# Patient Record
Sex: MALE | Employment: UNEMPLOYED | URBAN - METROPOLITAN AREA
[De-identification: names, ages, dates, MRNs, and addresses within clinical notes are randomized per-mention and may not be internally consistent; named-entity substitution may affect disease eponyms.]

---

## 2022-01-04 ENCOUNTER — HOSPITAL ENCOUNTER (INPATIENT)
Age: 62
LOS: 19 days | Discharge: HOME | DRG: 885 | End: 2022-01-24
Attending: STUDENT IN AN ORGANIZED HEALTH CARE EDUCATION/TRAINING PROGRAM | Admitting: PSYCHIATRY & NEUROLOGY
Payer: MEDICARE

## 2022-01-04 DIAGNOSIS — F23 ACUTE PSYCHOSIS (HCC): Primary | ICD-10-CM

## 2022-01-04 PROCEDURE — 96372 THER/PROPH/DIAG INJ SC/IM: CPT

## 2022-01-04 PROCEDURE — 99285 EMERGENCY DEPT VISIT HI MDM: CPT

## 2022-01-05 PROBLEM — F29 PSYCHOSIS (HCC): Status: ACTIVE | Noted: 2022-01-05

## 2022-01-05 PROBLEM — F30.9 MANIA (HCC): Status: ACTIVE | Noted: 2022-01-05

## 2022-01-05 LAB
AMPHET UR QL SCN: NEGATIVE
ANION GAP SERPL CALC-SCNC: 3 MMOL/L (ref 5–15)
APAP SERPL-MCNC: <10 UG/ML (ref 10–30)
APPEARANCE UR: ABNORMAL
BACTERIA URNS QL MICRO: NEGATIVE /HPF
BARBITURATES UR QL SCN: NEGATIVE
BASOPHILS # BLD: 0 K/UL (ref 0–0.1)
BASOPHILS NFR BLD: 1 % (ref 0–1)
BENZODIAZ UR QL: NEGATIVE
BILIRUB UR QL: NEGATIVE
BUN SERPL-MCNC: 17 MG/DL (ref 6–20)
BUN/CREAT SERPL: 17 (ref 12–20)
CA-I BLD-MCNC: 9 MG/DL (ref 8.5–10.1)
CANNABINOIDS UR QL SCN: NEGATIVE
CHLORIDE SERPL-SCNC: 105 MMOL/L (ref 97–108)
CO2 SERPL-SCNC: 27 MMOL/L (ref 21–32)
COCAINE UR QL SCN: NEGATIVE
COLOR UR: ABNORMAL
CREAT SERPL-MCNC: 0.98 MG/DL (ref 0.7–1.3)
DIFFERENTIAL METHOD BLD: NORMAL
DRUG SCRN COMMENT,DRGCM: NORMAL
EOSINOPHIL # BLD: 0.2 K/UL (ref 0–0.4)
EOSINOPHIL NFR BLD: 3 % (ref 0–7)
ERYTHROCYTE [DISTWIDTH] IN BLOOD BY AUTOMATED COUNT: 11.5 % (ref 11.5–14.5)
ETHANOL SERPL-MCNC: <4 MG/DL
FLUAV RNA SPEC QL NAA+PROBE: NOT DETECTED
FLUBV RNA SPEC QL NAA+PROBE: NOT DETECTED
GLUCOSE SERPL-MCNC: 99 MG/DL (ref 65–100)
GLUCOSE UR STRIP.AUTO-MCNC: NEGATIVE MG/DL
HCT VFR BLD AUTO: 41.1 % (ref 36.6–50.3)
HGB BLD-MCNC: 14 G/DL (ref 12.1–17)
HGB UR QL STRIP: NEGATIVE
IMM GRANULOCYTES # BLD AUTO: 0 K/UL (ref 0–0.04)
IMM GRANULOCYTES NFR BLD AUTO: 0 % (ref 0–0.5)
KETONES UR QL STRIP.AUTO: 20 MG/DL
LEUKOCYTE ESTERASE UR QL STRIP.AUTO: NEGATIVE
LITHIUM SERPL-SCNC: 1.1 MMOL/L (ref 0.6–1.2)
LYMPHOCYTES # BLD: 1.9 K/UL (ref 0.8–3.5)
LYMPHOCYTES NFR BLD: 30 % (ref 12–49)
MCH RBC QN AUTO: 31.7 PG (ref 26–34)
MCHC RBC AUTO-ENTMCNC: 34.1 G/DL (ref 30–36.5)
MCV RBC AUTO: 93.2 FL (ref 80–99)
METHADONE UR QL: NEGATIVE
MONOCYTES # BLD: 0.7 K/UL (ref 0–1)
MONOCYTES NFR BLD: 12 % (ref 5–13)
MUCOUS THREADS URNS QL MICRO: ABNORMAL /LPF
NEUTS SEG # BLD: 3.5 K/UL (ref 1.8–8)
NEUTS SEG NFR BLD: 54 % (ref 32–75)
NITRITE UR QL STRIP.AUTO: NEGATIVE
NRBC # BLD: 0 K/UL (ref 0–0.01)
NRBC BLD-RTO: 0 PER 100 WBC
OPIATES UR QL: NEGATIVE
PCP UR QL: NEGATIVE
PH UR STRIP: 7 [PH] (ref 5–8)
PLATELET # BLD AUTO: 211 K/UL (ref 150–400)
PMV BLD AUTO: 9.9 FL (ref 8.9–12.9)
POTASSIUM SERPL-SCNC: 3.7 MMOL/L (ref 3.5–5.1)
PROT UR STRIP-MCNC: NEGATIVE MG/DL
RBC # BLD AUTO: 4.41 M/UL (ref 4.1–5.7)
RBC #/AREA URNS HPF: ABNORMAL /HPF (ref 0–5)
SALICYLATES SERPL-MCNC: <1.7 MG/DL (ref 2.8–20)
SARS-COV-2, COV2: NOT DETECTED
SODIUM SERPL-SCNC: 135 MMOL/L (ref 136–145)
SP GR UR REFRACTOMETRY: 1.02 (ref 1–1.03)
UA: UC IF INDICATED,UAUC: ABNORMAL
UROBILINOGEN UR QL STRIP.AUTO: 0.1 EU/DL (ref 0.1–1)
WBC # BLD AUTO: 6.3 K/UL (ref 4.1–11.1)
WBC URNS QL MICRO: ABNORMAL /HPF (ref 0–4)
YEAST URNS QL MICRO: ABNORMAL

## 2022-01-05 PROCEDURE — 80178 ASSAY OF LITHIUM: CPT

## 2022-01-05 PROCEDURE — 96372 THER/PROPH/DIAG INJ SC/IM: CPT

## 2022-01-05 PROCEDURE — 74011250637 HC RX REV CODE- 250/637: Performed by: PSYCHIATRY & NEUROLOGY

## 2022-01-05 PROCEDURE — 87636 SARSCOV2 & INF A&B AMP PRB: CPT

## 2022-01-05 PROCEDURE — 74011250637 HC RX REV CODE- 250/637: Performed by: STUDENT IN AN ORGANIZED HEALTH CARE EDUCATION/TRAINING PROGRAM

## 2022-01-05 PROCEDURE — 80179 DRUG ASSAY SALICYLATE: CPT

## 2022-01-05 PROCEDURE — 65220000003 HC RM SEMIPRIVATE PSYCH

## 2022-01-05 PROCEDURE — 82077 ASSAY SPEC XCP UR&BREATH IA: CPT

## 2022-01-05 PROCEDURE — 81001 URINALYSIS AUTO W/SCOPE: CPT

## 2022-01-05 PROCEDURE — 80307 DRUG TEST PRSMV CHEM ANLYZR: CPT

## 2022-01-05 PROCEDURE — 85025 COMPLETE CBC W/AUTO DIFF WBC: CPT

## 2022-01-05 PROCEDURE — 36415 COLL VENOUS BLD VENIPUNCTURE: CPT

## 2022-01-05 PROCEDURE — 80048 BASIC METABOLIC PNL TOTAL CA: CPT

## 2022-01-05 PROCEDURE — 74011250636 HC RX REV CODE- 250/636

## 2022-01-05 PROCEDURE — 80143 DRUG ASSAY ACETAMINOPHEN: CPT

## 2022-01-05 RX ORDER — HYDROXYZINE 50 MG/1
50 TABLET, FILM COATED ORAL
Status: DISCONTINUED | OUTPATIENT
Start: 2022-01-05 | End: 2022-01-12

## 2022-01-05 RX ORDER — HALOPERIDOL 5 MG/ML
5 INJECTION INTRAMUSCULAR
Status: DISCONTINUED | OUTPATIENT
Start: 2022-01-05 | End: 2022-01-12

## 2022-01-05 RX ORDER — LORAZEPAM 2 MG/ML
INJECTION INTRAMUSCULAR
Status: COMPLETED
Start: 2022-01-05 | End: 2022-01-05

## 2022-01-05 RX ORDER — LORAZEPAM 1 MG/1
2 TABLET ORAL
Status: COMPLETED | OUTPATIENT
Start: 2022-01-05 | End: 2022-01-05

## 2022-01-05 RX ORDER — HALOPERIDOL 5 MG/ML
5 INJECTION INTRAMUSCULAR ONCE
Status: COMPLETED | OUTPATIENT
Start: 2022-01-05 | End: 2022-01-05

## 2022-01-05 RX ORDER — ACETAMINOPHEN 325 MG/1
650 TABLET ORAL
Status: DISCONTINUED | OUTPATIENT
Start: 2022-01-05 | End: 2022-01-24 | Stop reason: HOSPADM

## 2022-01-05 RX ORDER — LORAZEPAM 2 MG/ML
2 INJECTION INTRAMUSCULAR
Status: COMPLETED | OUTPATIENT
Start: 2022-01-05 | End: 2022-01-05

## 2022-01-05 RX ORDER — LORAZEPAM 1 MG/1
1 TABLET ORAL
Status: DISCONTINUED | OUTPATIENT
Start: 2022-01-05 | End: 2022-01-13

## 2022-01-05 RX ORDER — TRAZODONE HYDROCHLORIDE 50 MG/1
50 TABLET ORAL
Status: DISCONTINUED | OUTPATIENT
Start: 2022-01-05 | End: 2022-01-09

## 2022-01-05 RX ORDER — LITHIUM CARBONATE 300 MG/1
300 TABLET, FILM COATED, EXTENDED RELEASE ORAL 2 TIMES DAILY
COMMUNITY
End: 2022-01-24

## 2022-01-05 RX ORDER — ADHESIVE BANDAGE
30 BANDAGE TOPICAL DAILY PRN
Status: DISCONTINUED | OUTPATIENT
Start: 2022-01-05 | End: 2022-01-24 | Stop reason: HOSPADM

## 2022-01-05 RX ORDER — HALOPERIDOL 5 MG/ML
INJECTION INTRAMUSCULAR
Status: COMPLETED
Start: 2022-01-05 | End: 2022-01-05

## 2022-01-05 RX ORDER — HALOPERIDOL 5 MG/1
5 TABLET ORAL
Status: DISCONTINUED | OUTPATIENT
Start: 2022-01-05 | End: 2022-01-12

## 2022-01-05 RX ORDER — LORAZEPAM 2 MG/ML
1 INJECTION INTRAMUSCULAR
Status: DISCONTINUED | OUTPATIENT
Start: 2022-01-05 | End: 2022-01-17

## 2022-01-05 RX ORDER — LITHIUM CARBONATE 300 MG
300 TABLET ORAL 2 TIMES DAILY
Status: DISCONTINUED | OUTPATIENT
Start: 2022-01-05 | End: 2022-01-06

## 2022-01-05 RX ADMIN — LITHIUM CARBONATE 300 MG: 300 TABLET ORAL at 21:16

## 2022-01-05 RX ADMIN — TRAZODONE HYDROCHLORIDE 50 MG: 50 TABLET ORAL at 21:16

## 2022-01-05 RX ADMIN — HALOPERIDOL LACTATE 5 MG: 5 INJECTION, SOLUTION INTRAMUSCULAR at 00:41

## 2022-01-05 RX ADMIN — LORAZEPAM 2 MG: 2 INJECTION INTRAMUSCULAR at 00:41

## 2022-01-05 RX ADMIN — LORAZEPAM 2 MG: 2 INJECTION INTRAMUSCULAR; INTRAVENOUS at 00:41

## 2022-01-05 RX ADMIN — LORAZEPAM 1 MG: 1 TABLET ORAL at 23:55

## 2022-01-05 RX ADMIN — LORAZEPAM 2 MG: 1 TABLET ORAL at 00:23

## 2022-01-05 RX ADMIN — HALOPERIDOL 5 MG: 5 INJECTION INTRAMUSCULAR at 00:41

## 2022-01-05 NOTE — BSMART NOTE
Patient accepted by Dr. Melo Members to [address] [place] [address]. Spoke with Dr. Mitch Chavez on the phone with patient- she informed patient of plan after his for seen discharge. Patient calm and cooperative during call- expressed gratefulness to have a plan to go home to [facility] after discharge. Patient also gave verbal consent to Dr. Mitch Chavez to share information with his wife and Dr. Xu Cox ([facility]).

## 2022-01-05 NOTE — ED TRIAGE NOTES
Pt to [facility] via EMS, coming from [facility] for \"running around naked threatening to hurt people\", pt deneis any Si/Hi at this time, pt alert but unable to answer any valuable questions about why he was brought here, flight of ideas noted, supposedly works with [employer], pt unable to confirm this, Vss

## 2022-01-05 NOTE — ED NOTES
[facility]19 states we need to take out the ECO that the staff here needs to do it before she can pre screen him

## 2022-01-05 NOTE — ED NOTES
TRANSFER - OUT REPORT:    Verbal report given to Ashanti ORELLANA on Lubna Muñoz  being transferred to [facility] for routine progression of care       Report consisted of patients Situation, Background, Assessment and   Recommendations(SBAR). Information from the following report(s) SBAR and Recent Results was reviewed with the receiving nurse. Lines:       Opportunity for questions and clarification was provided.       Patient transported with:   Mover

## 2022-01-05 NOTE — BSMART NOTE
Dr[facility] Carm[facility] Carp[facility] and would like to be contacted in regards to patient placement once bed is found and any other significant changes. States she is leading patient's case on [employer]'s end. Can be contacted at [phone] or [phone].

## 2022-01-05 NOTE — ED NOTES
Pt placed hands on sitter and attempted to leave the ER, CODE JIMMIE called, VO for 2mg ativan IM and 5mg Haldol IM given by ER MD

## 2022-01-05 NOTE — PROGRESS NOTES
Spiritual Care Assessment/Progress Note  [facility]      NAME: Bernabe Gunderson      MRN: [id number]  AGE: 64 y.o.  SEX: male  Lutheran Affiliation:    Language:      1/5/2022     Total Time (in minutes): 10     Spiritual Assessment begun in [facility] EMERGENCY DEPT through conversation with:         [x]Patient        [] Family    [] Friend(s)        Reason for Consult: Initial/Spiritual assessment, patient floor     Spiritual beliefs: (Please include comment if needed)     [x] Identifies with a marcelina tradition:  Jainism       [] Supported by a marcelina community:            [] Claims no spiritual orientation:           [] Seeking spiritual identity:                [] Adheres to an individual form of spirituality:           [] Not able to assess:                           Identified resources for coping:      [] Prayer                               [] Music                  [] Guided Imagery     [x] Family/friends                 [] Pet visits     [] Devotional reading                         [] Unknown     [] Other:                                              Interventions offered during this visit: (See comments for more details)    Patient Interventions: Affirmation of marcelina,Catharsis/review of pertinent events in supportive environment,Coping skills reviewed/reinforced,Iconic (affirming the presence of God/Higher Power)           Plan of Care:     [] Support spiritual and/or cultural needs    [] Support AMD and/or advance care planning process      [] Support grieving process   [] Coordinate Rites and/or Rituals    [] Coordination with community clergy   [] No spiritual needs identified at this time   [] Detailed Plan of Care below (See Comments)  [] Make referral to Music Therapy  [] Make referral to Pet Therapy     [] Make referral to Addiction services  [] Make referral to [facility]  [] Make referral to Spiritual Care Partner  [] No future visits requested        [x] Contact Spiritual Care for further referrals     Comments:  visit for initial spiritual assessment. Spoke to patient's nurse prior to visit, patient awaiting [facility] [place] for further treatment and care. Patient sitting up in gurney eating his lunch, good eye contact, friendly. Provided spiritual presence and listening as he spoke of his present thoughts, feelings, and concerns. Upon greeting and introduction, patient spoke of his marcelina speaking about his relationship with God and his ability to see God in others, which he holds dear, and not seeing God in others, whom he places in a trap since they tend to place him in a trap due to not being able to see as he sees. Says he is looking forward to going home and hopes to return to his family. Says he has seven (7) children and twenty (20) grandchildren. 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 He stated he was comfortable and not in need of anything at this time. He appeared comforted and encouraged as a result of this visit and expressed gratitude for this visit. Informed him of availability of  and pastoral care services asking him to have his nurse contact the  should he desire a  visit and he agreed giving a thumbs up and fist bump. Rev.  Adriana Vila MDiv, [facility], [facility]   paging service: [phone])

## 2022-01-05 NOTE — ED NOTES
[employer] called for update and state if needed to call them at the following numbers     [employer] [phone]    [facility] [employer] [phone]
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
## 2022-01-05 NOTE — ED NOTES
[facility] confirmed this is the patients name and .      ECO paperwork faxed to  at this time

## 2022-01-05 NOTE — ED NOTES
Bud SAMUEL called on patient @ 0030 Patient in the hallway acting bizarre patient encouraged back into the room by security.  Bea LOWE called at this time and [facility]

## 2022-01-05 NOTE — ED NOTES
Returned MD Maria Antonia Mckeon phone call regarding the patient. MD stated that he does not have any family here and his wife is in [address] [place]. MD wants to be notified if the patient decides to leave AMA so that she can try and talk to him regarding it.
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
## 2022-01-05 NOTE — ED PROVIDER NOTES
EMERGENCY DEPARTMENT HISTORY AND PHYSICAL EXAM      Date: 1/4/2022  Patient Name: Mira Burks      History of Presenting Illness     Chief Complaint   Patient presents with   [address] Problem       History Provided By: Patient    HPI: Mira Burks, 64 y.o. male with no known past medical history of note presents to the ED by EMS. EMS reported the patient is running around naked threatening to hurt people. I did evaluate the patient he appears psychotic and manic. I was unable to obtain accurate history, nor contact information for this patient. There are no other complaints, changes, or physical findings at this time. PCP: Unknown, Provider, DPM        Past History     Past Medical History:  History reviewed. No pertinent past medical history. Past Surgical History:  History reviewed. No pertinent surgical history. Family History:  History reviewed. No pertinent family history. Social History:  Social History     Tobacco Use    Smoking status: Not on file    Smokeless tobacco: Not on file   Substance Use Topics    Alcohol use: Yes     Alcohol/week: 2.0 standard drinks     Types: 2 Shots of liquor per week     Comment: 2 shots every two weeks    Drug use: Not on file       Allergies:  No Known Allergies      Review of Systems     Review of Systems   Unable to perform ROS: Psychiatric disorder       Physical Exam     Physical Exam  Vitals and nursing note reviewed. Constitutional:       Appearance: He is obese. He is not ill-appearing or diaphoretic. HENT:      Head: Normocephalic and atraumatic. Eyes:      Extraocular Movements: Extraocular movements intact. Conjunctiva/sclera: Conjunctivae normal.   Cardiovascular:      Rate and Rhythm: Normal rate and regular rhythm. Pulmonary:      Effort: Pulmonary effort is normal.      Breath sounds: Normal breath sounds. Abdominal:      Palpations: Abdomen is soft. Tenderness: There is no abdominal tenderness. Neurological:      General: No focal deficit present. Mental Status: He is alert. Psychiatric:         Attention and Perception: He is inattentive. Mood and Affect: Mood is elated. Speech: Speech is rapid and pressured. Lab and Diagnostic Study Results     Labs -     No results found for this or any previous visit (from the past 12 hour(s)). Radiologic Studies -   [unfilled]  CT Results  (Last 48 hours)    None        CXR Results  (Last 48 hours)    None          Medical Decision Making and ED Course   - I am the first and primary provider for this patient AND AM THE PRIMARY PROVIDER OF RECORD. - I reviewed the vital signs, available nursing notes, past medical history, past surgical history, family history and social history. - Initial assessment performed. The patients presenting problems have been discussed, and the staff are in agreement with the care plan formulated and outlined with them. I have encouraged them to ask questions as they arise throughout their visit. Vital Signs-Reviewed the patient's vital signs. Patient Vitals for the past 24 hrs:   Temp Pulse Resp BP SpO2   01/06/22 0938 98.6 °F (37 °C) 98 18 (!) 148/94    01/06/22 0820 98.6 °F (37 °C) 98 18 (!) 148/94    01/05/22 1922 99.3 °F (37.4 °C) (!) 105 18 131/76 100 %   01/05/22 1820 98.2 °F (36.8 °C) (!) 101  (!) 168/88        Records Reviewed: Nursing Notes    Provider Notes (Medical Decision Making):     Patient is presenting to the ED by EMS with bizarre behavior. Patient appeared manic and probably of some component of psychosis. His examination did not reveal any focal medical issue. Screening labs were obtained which did not show any acute abnormality. Patient had an episode of aggressive behavior in the ED requiring administration of Haldol and Ativan. This helped calm down the patient.   Pending evaluation by behavioral health for final disposition which is anticipated to be hospitalization. Disposition     Disposition: Condition stable  Admitted to [facility] the case was discussed with the admitting physician     Behavioral Health Hold    Diagnosis     Clinical Impression:   Acute psychosis  Aggressive behavior  Attestations: Roslyn Molina MD    Please note that this dictation was completed with [employer], the computer voice recognition software. Quite often unanticipated grammatical, syntax, homophones, and other interpretive errors are inadvertently transcribed by the computer software. Please disregard these errors. Please excuse any errors that have escaped final proofreading. Thank you.
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
## 2022-01-06 PROCEDURE — 74011250637 HC RX REV CODE- 250/637: Performed by: PSYCHIATRY & NEUROLOGY

## 2022-01-06 PROCEDURE — 65220000003 HC RM SEMIPRIVATE PSYCH

## 2022-01-06 RX ORDER — LITHIUM CARBONATE 300 MG
300 TABLET ORAL 3 TIMES DAILY
Status: DISCONTINUED | OUTPATIENT
Start: 2022-01-06 | End: 2022-01-24 | Stop reason: HOSPADM

## 2022-01-06 RX ADMIN — HYDROXYZINE HYDROCHLORIDE 50 MG: 50 TABLET, FILM COATED ORAL at 21:09

## 2022-01-06 RX ADMIN — LITHIUM CARBONATE 300 MG: 300 TABLET ORAL at 08:18

## 2022-01-06 RX ADMIN — LITHIUM CARBONATE 300 MG: 300 TABLET ORAL at 21:09

## 2022-01-06 RX ADMIN — LITHIUM CARBONATE 300 MG: 300 TABLET ORAL at 15:22

## 2022-01-06 RX ADMIN — TRAZODONE HYDROCHLORIDE 50 MG: 50 TABLET ORAL at 23:33

## 2022-01-06 RX ADMIN — LORAZEPAM 1 MG: 1 TABLET ORAL at 08:18

## 2022-01-06 RX ADMIN — ACETAMINOPHEN 650 MG: 325 TABLET ORAL at 23:33

## 2022-01-06 RX ADMIN — HALOPERIDOL 5 MG: 5 TABLET ORAL at 04:48

## 2022-01-06 RX ADMIN — LORAZEPAM 1 MG: 1 TABLET ORAL at 21:09

## 2022-01-06 NOTE — PROGRESS NOTES
Problem: Falls - Risk of  Goal: *Absence of Falls  Description: Document Raymundo Boles Fall Risk and appropriate interventions in the flowsheet. Outcome: Progressing Towards Goal  Note: Fall Risk Interventions:            Problem: Depressed Mood (Adult/Pediatric)  Goal: *STG: Remains safe in hospital  Outcome: Progressing Towards Goal     Problem: Depressed Mood (Adult/Pediatric)  Goal: *STG: Complies with medication therapy  Outcome: Progressing Towards Goal     Problem: Anxiety  Goal: *Alleviation of anxiety  Outcome: Not Progressing Towards Goal    Patient remains safe in the hospital; no falls reported or observed. Patient denies SI, HI, A/V hallucinations. Patient requested anxiety medication, but did not rate anxiety or depression. Patient received Atarax and the medication was effective; no report of anxiety.

## 2022-01-06 NOTE — PROGRESS NOTES
Patient alert and oriented to person and place. Patient denies anxiety and depression. He denies si/hi/avh. Patient in milieu interacting and attending groups. Patient is bizarre and occasionally confused. He is approaching and pushing on back doors and office entrance door. Patient has pressured speech and is anxious. He has a flat affect and anxious mood. Patient making statements about working for the [employer] and curing polio. Patient is medication compliant, calm and cooperative at this time. Will continue to monitor.
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
## 2022-01-06 NOTE — GROUP NOTE
IP  GROUP DOCUMENTATION INDIVIDUAL                                                                          Group Therapy Note    Date: 1/6/2022    Group Start Time: 0930  Group End Time: 5844  Group Topic: Education Group - Inpatient    SRM 2  NON ACUTE    Jasbir June    IP [address] GROUP DOCUMENTATION GROUP    Group Therapy Note    Facilitated group to introduce the definition and benefits of diaphragmatic breathing and demonstration of relaxation technique    Attendees: 7/13         Attendance: Attended    Patient's Goal:  Attend group daily     Interventions/techniques: Informed and Supported    Follows Directions: Followed directions    Interactions: Interacted appropriately    Mental Status: Calm    Behavior/appearance: Cooperative    Goals Achieved: Able to engage in interactions and Able to listen to others       Additional Notes:  Receptive to information discussed and was able to demonstrate ability to practice diaphragmatic breathing technique. Was able to sit quietly and focus on guided imagery.  Verbalized feeling relaxed    Jermaine Kole, CTRS

## 2022-01-06 NOTE — PROGRESS NOTES
Problem: Falls - Risk of  Goal: *Absence of Falls  Description: Document [place] Fall Risk and appropriate interventions in the flowsheet.   Outcome: Progressing Towards Goal  Note: Fall Risk Interventions:                                Problem: Patient Education: Go to Patient Education Activity  Goal: Patient/Family Education  Outcome: Progressing Towards Goal

## 2022-01-06 NOTE — BH NOTES
Patient woke up and started to push on the back doors until they opened and was on the back unit briefly. Patient continues to be confused with delusions that he is base; patient is not violent and is redirectable. Patient stated that he can feel the gravity under his feet and the morning on the floor. Patient given PRN Haldol 5 mg PO; compliant and currently resting.

## 2022-01-06 NOTE — BH NOTES
Nurse Note:    Patient observed in the dayroom tearful at the beginning of the shift. During the assessment patient appeared flat and confused; patient stated, \"Can I stay here until my family comes\". Patient needed to be reoriented. Patient denies SI, HI, A/V hallucinations. No report of anxiety or depression. No report of pain or discomfort. Patient was medication compliant, requested medication for sleep and tolerated the medications. Patient is resting quietly; will continue to monitor for safety.
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
## 2022-01-06 NOTE — BH NOTES
Patient slept for a couple of hours before pushing on the back door and entering the unit. Patient was easily redirected by the behavioral tech back to the front. While on the hallway patient continued to push on the back and side door; staff explained that he should not push on the doors causing the alarms to come on. Patient appeared confused and postured when asked to return to the room; the posture was not threatening. Patient redirected to his room and observed again on the hallway. Patient redirected back to the room again; patient put his hands behind his back as if in formation in the [facility] and thought \"we\" as in the staff and himself were to cure Polio. Patient reported anxiety, but did not rate it and received Ativan 1 mg PO for anxiety and is currently resting quietly. Will continue to monitor for safety.

## 2022-01-06 NOTE — GROUP NOTE
[facility] GROUP DOCUMENTATION INDIVIDUAL                                                                          Group Therapy Note    Date: 1/6/2022    Group Start Time: 1115  Group End Time: 1150  Group Topic: Process Group - Inpatient    SRM CARE MANAGEMENT    Elke Torres BSW    [facility] GROUP DOCUMENTATION GROUP    Group Therapy Note    The writer encouraged the patient's to process through open conversation     Attendees: 8/12         Attendance: Attended    Patient's Goal:  Attend Group     Interventions/techniques: Promoted peer support and Provide feedback    Follows Directions: Followed directions    Interactions: Interacted appropriately    Mental Status: Calm, Delusions and Flat    Behavior/appearance: Attentive, Cooperative and Neatly groomed    Goals Achieved: Able to engage in interactions, Able to listen to others, Able to give feedback to another, Able to reflect/comment on own behavior, Able to receive feedback and Able to self-disclose      Additional Notes: The patient encouraged peers to speak and was attentive in group. He was open and talked about living in [place] with the group and was appropriate in group.      BROOKE Segura

## 2022-01-06 NOTE — BH NOTES
PSA PART II ADDITIONAL INFORMATION        Access To Fire Arms: No    Substance Use: NO    Last Use: N/A    Type of Substance: no substance use    Frequency of Use: N/A    Request to See : YES    If yes, notified : NO    Release of Information Signed: NO    Release of Information Signed For: The patient is unable to remember numbers

## 2022-01-06 NOTE — GROUP NOTE
IP  GROUP DOCUMENTATION INDIVIDUAL                                                                          Group Therapy Note    Date: 1/6/2022    Group Start Time: 1320  Group End Time: 2999  Group Topic: Recreational/Music Therapy    SRM 2  NON ACUTE    Mary Lou Lee    IP [facility] GROUP DOCUMENTATION GROUP    Group Therapy Note    Facilitated leisure skills group to reinforce positive coping through music, social interaction, group activities and arts/crafts    Attendees: 8/12         Attendance: Attended    Patient's Goal:  Attend group daily    Interventions/techniques: Art integration and Supported    Follows Directions: Followed directions    Interactions: Interacted appropriately    Mental Status: Calm    Behavior/appearance: Cooperative    Goals Achieved: Able to engage in interactions and Able to listen to others      Additional Notes:  Receptive to listening to music and a song he selected. Pt was observed doing different hand movements. Decline to work on leisure task.  Interacted with peers and staff     Manju Gordillo

## 2022-01-07 PROCEDURE — 65220000003 HC RM SEMIPRIVATE PSYCH

## 2022-01-07 PROCEDURE — 74011250637 HC RX REV CODE- 250/637: Performed by: PSYCHIATRY & NEUROLOGY

## 2022-01-07 RX ADMIN — LORAZEPAM 1 MG: 1 TABLET ORAL at 08:39

## 2022-01-07 RX ADMIN — HYDROXYZINE HYDROCHLORIDE 50 MG: 50 TABLET, FILM COATED ORAL at 22:03

## 2022-01-07 RX ADMIN — LITHIUM CARBONATE 300 MG: 300 TABLET ORAL at 08:39

## 2022-01-07 RX ADMIN — LITHIUM CARBONATE 300 MG: 300 TABLET ORAL at 22:03

## 2022-01-07 RX ADMIN — TRAZODONE HYDROCHLORIDE 50 MG: 50 TABLET ORAL at 22:03

## 2022-01-07 RX ADMIN — LITHIUM CARBONATE 300 MG: 300 TABLET ORAL at 17:10

## 2022-01-07 NOTE — GROUP NOTE
IP  GROUP DOCUMENTATION INDIVIDUAL                                                                          Group Therapy Note    Date: 1/7/2022    Group Start Time: 2902  Group End Time: 1400  Group Topic: Recreational/Music Therapy    SRM 2  NON ACUTE    Cali Licona    IP [address] [facility] GROUP DOCUMENTATION GROUP    Group Therapy Note    Facilitated leisure skills group to reinforce positive coping through music, social interaction, group activities and arts/crafts    Attendees: 9/13         Attendance: Attended    Patient's Goal:  Attend group daily     Interventions/techniques: Art integration and Supported    Follows Directions: Followed directions    Interactions: Interacted appropriately    Mental Status: Calm    Behavior/appearance: Cooperative    Goals Achieved: Able to engage in interactions and Able to listen to others      Additional Notes:  Receptive to listening to music and a song he selected while working on leisure task.  Interacted with when prompted    GUSTABO Moore

## 2022-01-07 NOTE — BH NOTES
Behavioral Health Treatment Team Note     Patient goal(s) for today: To be discharged   Treatment team focus/goals: To continue group therapy and medication management     Progress note: The pt had eloped from the unit earlier in the morning around 0830. He got to the main entrance of the hospital and had his belongings in his hand. He was able to be redirected by staff eventually to return to the unit, after he continued to say \"I am discharging myself\". Once on the unit this writer asked if he would like to contact his psychologist Holger Villa together, which he said he would be willing to do. He had explained what had happened and that he wanted to discharge himself. She validated his frustrations about being hospitalized, although explained to him that he would have no where to go, and that it would have been dangerous if he had done that. She said that he has no way back home to [place], and said that he cannot return to [facility], as they have already cleaned out his room there. She expressed that she was worried about his judgement right now. The psychologist also spoke about wanting him to be TDO'd. The pt explained that \"this has been happening\" since December 24th, although was guarded about what he was referring to. He eventually said that he normally takes Lithium which he did not come to [place] with. He said that he can tell when he is about to have a \"high episode\", referring to a manic episode. This writer gave him positive reinforcement for his level of insight. The pt seemed paranoid towards the end of the session, stating that his psychologists voice sounded different, and didn't feel like it was actually her. He denied SI/HI and AH/VH's. An inpatient level of care is necessary in order to stabilize the pt further on medications.      LOS:  2  Expected LOS: 5-7 days     Insurance info/prescription coverage:  Self-pay  Date of last family contact:  Spoke with his psychologist today  Family requesting physician contact today:  no  Discharge plan:   To continue to stabilize the pt's symptoms and mood further   Guns in the home:  no   Outpatient provider(s):  Psychologist Mary Galvan     Participating treatment team members: Thaddeus Cortez, * (assigned SW), Matias Lama LMSW

## 2022-01-07 NOTE — PROGRESS NOTES
Problem: Falls - Risk of  Goal: *Absence of Falls  Description: Document Felice Childs Fall Risk and appropriate interventions in the flowsheet.   Outcome: Progressing Towards Goal  Note: Fall Risk Interventions:                                Problem: Patient Education: Go to Patient Education Activity  Goal: Patient/Family Education  Outcome: Progressing Towards Goal     Problem: Depressed Mood (Adult/Pediatric)  Goal: *STG: Complies with medication therapy  Outcome: Progressing Towards Goal

## 2022-01-07 NOTE — BH NOTES
Nurse Note:    Patient observed in the hallway and in the dayroom. Patient is alert with some confusion at times, doesn't know where he is and why he is here. Currently denies SI, HI, A/V hallucinations. Reports anxiety, but did not rate the depression. Denies depression and pain. Patient states that he is eating meals and sleeping. Patient is medication compliant; received Ativan for anxiety; medication is effective. Patient is currently sleeping at this time. Will continue to monitor for safety.

## 2022-01-07 NOTE — PROGRESS NOTES
Problem: Falls - Risk of  Goal: *Absence of Falls  Description: Document [place] Fail Fall Risk and appropriate interventions in the flowsheet. Outcome: Progressing Towards Goal  Note: Fall Risk Interventions:          Problem: Depressed Mood (Adult/Pediatric)  Goal: *STG: Remains safe in hospital  Outcome: Progressing Towards Goal     Problem: Depressed Mood (Adult/Pediatric)  Goal: *STG: Complies with medication therapy  Outcome: Progressing Towards Goal      No falls reported or observed this shift. Currently denies SI, HI, A/V hallucinations. Patient is medication compliant; reports anxiety, but did not rate the anxiety. Received medication for anxiety; medication is effective. Patient is calm and currently sleeping at this time.

## 2022-01-07 NOTE — PROGRESS NOTES
Patient alert and oriented to self and place. Patient eloped from unit this am. He was in lobby with staff. Patient agreed to come up after several prompts. Dr. Pedro Mckeon notified. New orders received for 1:1. Patient received prn and morning medication. He is in dayroom eating at this time. Will continue to monitor.

## 2022-01-07 NOTE — BH NOTES
Writer observed pt in [facility]. Argelia Zuniga was called and writer alerted pts  Hermelinda Yusuf. , writer and [employer] spoke with pt in [facility] who stated he is ready for discharge and wants to return to his family. Pt said he has been in his same 'loop'.  Hermelinda Yusuf was able to redirect pt and he came back onto [address] [place] [address]

## 2022-01-07 NOTE — GROUP NOTE
IP  GROUP DOCUMENTATION INDIVIDUAL                                                                          Group Therapy Note    Date: 1/7/2022    Group Start Time: 0930  Group End Time: 3447  Group Topic: Education Group - Inpatient    SRM 2  NON ACUTE    Edis Ryan    IP [address] [facility] GROUP DOCUMENTATION GROUP    Group Therapy Note    Facilitated discussion focused on learning to explore and communicate feelings    Attendees: 8/13         Attendance: Attended    Patient's Goal:  Attend group daily     Interventions/techniques: Informed and Supported    Follows Directions: Followed directions    Interactions: Interacted appropriately    Mental Status: Calm    Behavior/appearance: Cooperative    Goals Achieved: Able to engage in interactions, Able to listen to others and Able to self-disclose      Additional Notes:  Receptive to information and engaged in discussion. Pt was able to share what make him feel a certain way.  Pt shared he feel happy when \"with his family\" and feel sad when Bernabe Martinez is being stopped from going to see his family and his wife\"    Dave Valle, [address]

## 2022-01-07 NOTE — GROUP NOTE
MARNIE  GROUP DOCUMENTATION INDIVIDUAL                                                                          Group Therapy Note    Date: 1/7/2022    Group Start Time: 1115  Group End Time: 1150  Group Topic: Process Group - Inpatient    SRM CARE MANAGEMENT    Elke Torres BSW    [facility] GROUP DOCUMENTATION GROUP    Group Therapy Note    The writer encouraged the group to process their time and what they have learned at the hospital, through open discussion and support for one another. Attendees: 10/13          Attendance: Attended    Patient's Goal:  Attend Group     Interventions/techniques: Supported    Follows Directions: Followed directions    Interactions: Interacted appropriately    Mental Status: Calm, Delusions, Hallucinations and Happy    Behavior/appearance: Attentive, Cooperative, Enthusiastic, Motivated and Needed prompting    Goals Achieved: Able to engage in interactions, Able to listen to others, Able to give feedback to another, Able to receive feedback and Able to self-disclose      Additional Notes: The patient expressed delusions when he was speaking and was hard to follow, but he was redirectable and was appropriate in group.      BROOKE Montalvo

## 2022-01-08 PROCEDURE — 65220000003 HC RM SEMIPRIVATE PSYCH

## 2022-01-08 PROCEDURE — 74011250637 HC RX REV CODE- 250/637: Performed by: PSYCHIATRY & NEUROLOGY

## 2022-01-08 PROCEDURE — 36415 COLL VENOUS BLD VENIPUNCTURE: CPT

## 2022-01-08 PROCEDURE — 82607 VITAMIN B-12: CPT

## 2022-01-08 RX ORDER — MULTIVITAMIN
1 TABLET ORAL DAILY
Status: DISCONTINUED | OUTPATIENT
Start: 2022-01-09 | End: 2022-01-24 | Stop reason: HOSPADM

## 2022-01-08 RX ORDER — QUETIAPINE FUMARATE 100 MG/1
100 TABLET, FILM COATED ORAL
Status: DISCONTINUED | OUTPATIENT
Start: 2022-01-08 | End: 2022-01-09

## 2022-01-08 RX ADMIN — ACETAMINOPHEN 650 MG: 325 TABLET ORAL at 13:04

## 2022-01-08 RX ADMIN — LORAZEPAM 1 MG: 1 TABLET ORAL at 21:28

## 2022-01-08 RX ADMIN — LITHIUM CARBONATE 300 MG: 300 TABLET ORAL at 08:34

## 2022-01-08 RX ADMIN — LORAZEPAM 1 MG: 1 TABLET ORAL at 02:11

## 2022-01-08 RX ADMIN — HYDROXYZINE HYDROCHLORIDE 50 MG: 50 TABLET, FILM COATED ORAL at 21:28

## 2022-01-08 RX ADMIN — LITHIUM CARBONATE 300 MG: 300 TABLET ORAL at 16:58

## 2022-01-08 RX ADMIN — QUETIAPINE FUMARATE 100 MG: 100 TABLET ORAL at 22:22

## 2022-01-08 RX ADMIN — LITHIUM CARBONATE 300 MG: 300 TABLET ORAL at 21:28

## 2022-01-08 RX ADMIN — TRAZODONE HYDROCHLORIDE 50 MG: 50 TABLET ORAL at 21:28

## 2022-01-08 NOTE — GROUP NOTE
IP  GROUP DOCUMENTATION INDIVIDUAL                                                                          Group Therapy Note    Date: 1/8/2022    Group Start Time: 0930  Group End Time: 4514  Group Topic: Education Group - Inpatient    SRM 2 BH NON ACUTE    Haley Burk    IP [address] [facility] GROUP DOCUMENTATION GROUP    Group Therapy Note    Attendees: 5/12    Facilitated structured group discussion about Automatic Negative Thoughts. Introduced common thoughts that are problematic in coping and cause negative emotions and identified positive ways to cope with those negative thoughts. Attendance: Attended    Patient's Goal:  STG: Attends activities and groups      Interventions/techniques: Informed, Provide feedback and Supported    Follows Directions: Followed directions    Interactions: Interacted appropriately    Mental Status: Calm and Flat    Behavior/appearance: Attentive    Goals Achieved: Able to engage in interactions and Able to listen to others      Additional Notes: Attended group discussion. Received materials provided in group. Engaged in group discussion and verbalized understanding of automatic negative thoughts. Recognized how this way of thinking can cause negative emotions and increased stress. Was receptive to intervention.     Nicolas Trujillo, CTRS

## 2022-01-08 NOTE — BH NOTES
08:00  Patient continues with one to one observation for flight risk: patient focused on vitamin B, asking several times for this,  Patient encouraged to ask doctor for vitamin orders. Patient verbalized understanding, He denied SI, HI, AH, VH, depression and anxiety stating he \"slept 2 hours night to burn off anxiety. \"  Patient with loose associations. Patient remains on close observation, Q 15 minute checks. 10:00  Patient attending group this morning. 12:00  Patient eating lunch in the dining room, social with peers. 13:05  One to one discontinued, per Dr. Saba Castillo. Patient promised not to go near the doors.

## 2022-01-08 NOTE — GROUP NOTE
IP  GROUP DOCUMENTATION INDIVIDUAL                                                                          Group Therapy Note    Date: 1/8/2022    Group Start Time: 1320  Group End Time: 1410  Group Topic: Recreational/Music Therapy    SRM 2 BH NON ACUTE    Erlene Florencio    IP [address] [facility] GROUP DOCUMENTATION GROUP    Group Therapy Note    Attendees: 9/13    Facilitated structured group to introduce healthy leisure task skill as positive way to cope and manage stress. Attendance: Attended    Patient's Goal:  STG: Attends activities and groups        Interventions/techniques: Art integration and Supported    Follows Directions: Followed directions    Interactions: Interacted appropriately    Mental Status: Calm and Flat    Behavior/appearance: Attentive and Cooperative    Goals Achieved: Able to engage in interactions and Able to listen to others      Additional Notes: Attended group and actively participated. Offered leisure packet. Worked on task in group and  listened to music. Was receptive to intervention and used time constructively.     Renard Tee

## 2022-01-08 NOTE — BH NOTES
Patient spent the ozzie watching TV or playing cards with peer in the day room. Patient has 1:1 staff with him, and has made no attempt to head to the door and no comments about leaving. Brita Spatz he is doing good, denies HI/SI and denies AVH, continues with some manic behavior. Patient took PRN at bedtime, for sleep, but at 0230 was still wide awake and anxious. Patient asked for an Ambien, stated he takes same at home when he needs it. Ativan given, suggested patient ask the Dr for Ambien on next rounds. Patient restless despite meds, barely slept, quiet but still managed to keep the roommate awake. Snores loudly. Pleasant,. No behavior problems.  Appears confused at times but seems as if he's trying to cover it up

## 2022-01-08 NOTE — PROGRESS NOTES
Progress Note  Date:2022       Room:[facility]  Patient Name:Anvi Flores     YOB: 1960     Age:61 y.o. Subjective    Subjective   Patient still presents with hypomanic mood racing thoughts impaired insight. But has been continuing to improve more coherent in conversation. He states he was able to sleep but not very much. He is focused on having to get back on vitamins and vitamin B12. He does report he has been up at night on and off and slept for a few hours and that his roommate keeps him awake. His roommate reports that he is up and talks to self and distract him. Patient denies any command hallucinations and feels his thoughts is improving. Mental status examination-patient is alert oriented to name place being in the hospital.  He acknowledges that he needs to be here for a few more days. He states he was able to talk to his granddaugter. He is less manic. Still impairment and his thought processes loosening of associations and flight of ideas. Insight judgment coping remains impaired but improving. Review of Systems  Objective         Vitals Last 24 Hours:  TEMPERATURE:  Temp  Av.9 °F (37.2 °C)  Min: 98.2 °F (36.8 °C)  Max: 99.5 °F (37.5 °C)  RESPIRATIONS RANGE: Resp  Av  Min: 18  Max: 20  PULSE OXIMETRY RANGE: No data recorded  PULSE RANGE: Pulse  Av  Min: 93  Max: 95  BLOOD PRESSURE RANGE: Systolic (40KHY), MM , Min:141 , IKU:136   ; Diastolic (65DGS), UQJ:53, Min:87, Max:95    I/O (24Hr): No intake or output data in the 24 hours ending 22 1201  Objective  Labs/Imaging/Diagnostics    Labs:  CBC:No results for input(s): WBC, RBC, HGB, HCT, MCV, RDW, PLT, HGBEXT, HCTEXT, PLTEXT in the last 72 hours. CHEMISTRIES:No results for input(s): NA, K, CL, CO2, BUN, CA, PHOS, MG in the last 72 hours. No lab exists for component: CREATININE, GLUCOSEPT/INR:No results for input(s): INR, INREXT in the last 72 hours.     No lab exists for component: PROTIME  APTT:No results for input(s): APTT in the last 72 hours. LIVER PROFILE:No results for input(s): AST, ALT in the last 72 hours. No lab exists for component: BILIDIR, BILITOT, ALKPHOS  No results found for: ALT, AST, GGT, GGTP, AP, APIT, APX, CBIL, TBIL, TBILI    Imaging Last 24 Hours:  No results found. Assessment//Plan   Active Problems:    [facility] ([facility] (1/5/2022)      Vesta ([facility] (1/5/2022)      Assessment & Plan  Started on Seroquel 100 mg at bedtime as he has been continuing to be hypomanic and still not able to sleep and rest well at night. Still presents with racing thoughts and his associations. Side effects reported vitamin B12 as patient states he is on vitamin B-12 tablets on the outside.       Current Facility-Administered Medications:     QUEtiapine (SEROquel) tablet 100 mg, 100 mg, Oral, QHS, Jesenia Leal MD    [START ON 1/9/2022] multivitamin with folic acid (ONE DAILY WITH FOLIC ACID) tablet 1 Tablet, 1 Tablet, Oral, DAILY, Jesenia Leal MD    lithium carbonate tablet 300 mg, 300 mg, Oral, TID, Jesenia Leal MD, 300 mg at 01/08/22 0834    hydrOXYzine HCL (ATARAX) tablet 50 mg, 50 mg, Oral, TID PRN, Gloria BARNEY MD, 50 mg at 01/07/22 2203    traZODone (DESYREL) tablet 50 mg, 50 mg, Oral, QHS PRN, Flex Cartwright MD, 50 mg at 01/07/22 2203    acetaminophen (TYLENOL) tablet 650 mg, 650 mg, Oral, Q4H PRN, Gloria BARNEY MD, 650 mg at 01/06/22 2333    magnesium hydroxide (MILK OF MAGNESIA) 400 mg/5 mL oral suspension 30 mL, 30 mL, Oral, DAILY PRN, Romie Larios MD    haloperidoL (HALDOL) tablet 5 mg, 5 mg, Oral, Q8H PRN, Lisseth Conroy MD, 5 mg at 01/06/22 0448    haloperidol lactate (HALDOL) injection 5 mg, 5 mg, IntraMUSCular, Q8H PRN, Jesenia Leal MD    LORazepam (ATIVAN) tablet 1 mg, 1 mg, Oral, Q8H PRN, Jesenia Leal MD, 1 mg at 01/08/22 0211    LORazepam (ATIVAN) injection 1 mg, 1 mg, IntraMUSCular, Q8H PRN, Lisseth Conroy MD  Electronically signed by Lawrence King MD on 1/8/2022 at 12:01 PM

## 2022-01-09 LAB — VIT B12 SERPL-MCNC: 1515 PG/ML (ref 193–986)

## 2022-01-09 PROCEDURE — 65220000003 HC RM SEMIPRIVATE PSYCH

## 2022-01-09 PROCEDURE — 74011250637 HC RX REV CODE- 250/637: Performed by: PSYCHIATRY & NEUROLOGY

## 2022-01-09 RX ORDER — QUETIAPINE FUMARATE 100 MG/1
200 TABLET, FILM COATED ORAL
Status: DISCONTINUED | OUTPATIENT
Start: 2022-01-09 | End: 2022-01-24 | Stop reason: HOSPADM

## 2022-01-09 RX ORDER — TRAZODONE HYDROCHLORIDE 50 MG/1
100 TABLET ORAL
Status: DISCONTINUED | OUTPATIENT
Start: 2022-01-09 | End: 2022-01-24 | Stop reason: HOSPADM

## 2022-01-09 RX ADMIN — LITHIUM CARBONATE 300 MG: 300 TABLET ORAL at 16:41

## 2022-01-09 RX ADMIN — HALOPERIDOL 5 MG: 5 TABLET ORAL at 08:05

## 2022-01-09 RX ADMIN — LORAZEPAM 1 MG: 1 TABLET ORAL at 09:19

## 2022-01-09 RX ADMIN — QUETIAPINE FUMARATE 200 MG: 100 TABLET ORAL at 21:19

## 2022-01-09 RX ADMIN — MULTIVITAMIN TABLET 1 TABLET: TABLET at 08:05

## 2022-01-09 RX ADMIN — LITHIUM CARBONATE 300 MG: 300 TABLET ORAL at 21:19

## 2022-01-09 RX ADMIN — LORAZEPAM 1 MG: 1 TABLET ORAL at 21:19

## 2022-01-09 RX ADMIN — LITHIUM CARBONATE 300 MG: 300 TABLET ORAL at 08:05

## 2022-01-09 NOTE — GROUP NOTE
IP  GROUP DOCUMENTATION INDIVIDUAL                                                                          Group Therapy Note    Date: 1/9/2022    Group Start Time: 1320  Group End Time: 1410  Group Topic: Recreational/Music Therapy    SRM 2 BH NON ACUTE    Ran Comings    IP [address] [facility] GROUP DOCUMENTATION GROUP    Group Therapy Note    Attendees:10/13  Facilitated structured group to introduce healthy leisure task skill as positive way to cope and manage stress. Attendance: Attended    Patient's Goal:  STG: Attends activities and groups        Interventions/techniques: Art integration and Supported    Follows Directions: Followed directions    Interactions: Interacted appropriately    Mental Status: Flat    Behavior/appearance: Cooperative    Goals Achieved: Able to listen to others      Additional Notes: Attended group and actively participated. Offered leisure packet. Declined packet but listened to music with peers. Able to select songs in group. Was receptive to intervention and used time constructively.     ZACARIAS ZaldivarS

## 2022-01-09 NOTE — BH NOTES
At the end of lunch, pt became verbally abusive to writer when explained to him that he could not keep the tan food tray and that he could keep the styrofoam one instead. Pt  threatened to throw milk on writer and began to closely follow behind writer. While stopped at the nurses' station, pt became verbally abusive again as he retrieved some items from the cart and walked down to the dayroom. During vitals, pt was asked to clean his items from the shower so that his roommate could use it. Patient became verbally abusive to writer. Pt became irredirectable. Pt, then postured towards staff and then sat down on his bed. Writer exited the room.

## 2022-01-09 NOTE — GROUP NOTE
IP  GROUP DOCUMENTATION INDIVIDUAL                                                                          Group Therapy Note    Date: 1/9/2022    Group Start Time: 0930  Group End Time: 0311  Group Topic: Education Group - Inpatient    SRM 2 BH NON ACUTE    Romana Bombard    [facility] GROUP DOCUMENTATION GROUP    Group Therapy Note    Attendees:9/13    Facilitated structured group discussion to identify strengths, positive attributes and to identify positive affirmations used to increase self esteem and coping with stressors. Attendance: Attended but left early    Patient's Goal: STG: Attends activities and groups      Interventions/techniques: Informed, Provide feedback and Supported    Follows Directions: Other minimal engagement    Interactions: Other minimal engagement    Mental Status: Calm    Behavior/appearance: Needed prompting    Goals Achieved: Able to listen to others      Additional Notes:  Initially attended group. Left group verbalizing he needed to talk to the nurse. Stated he needed to rest. Returned later to gather his belongings and left group and did not return.        Harini Lassiter, CTRS

## 2022-01-09 NOTE — BH NOTES
MHT informed writer that patient was threatening to throw milk on her. Patient was about 4 foot behind her with his arm up holding the open milk container, as if to make good his threat. Patient was easily redirected, returning to the dayroom with part of his breakfast.  Prn ativan given p.o.

## 2022-01-09 NOTE — BH NOTES
Patient affect flat, fair eye contact, and delusional.  Patient stating \"I have a suspicion, it appears a lazor system has been set up to steal personal information, everybodies personal information. \"  He then showed me his right arm as proof, where there is a reddened area approximately 2 1/2 to 3 inches square to the anticubital area. Patient informed writer that this was from a neoprine patch, that when the laser shined on him, it created the red area, if \"there was no bandage the laser does not affect it. \" \"I've got monumental proof. \"  Patient denied SI, HI, AH, and VH. Patient given prn haldol. Patient medication compliant and attends group. Patient remains on close observation, Q 15 minute checks.

## 2022-01-09 NOTE — PROGRESS NOTES
Progress Note  Date:2022       Room:[facility]  Patient Name:Avni Flores     YOB: 1960     Age:61 y.o. Subjective    Subjective   Patient has been presenting with delusional thought process. Patient reports that there is a laser system that is been placed on him and that he is stating personal information and selling other patients information. Patient continues to show area and his right forearm but a red discoloration which he states is from the laser. He states that I should involved risk-management to round up the bad guys and address the laser placement in this floor. Patient has been having poor sleep. He is reported to have slept 2 hours. Review of Systems  Objective         Vitals Last 24 Hours:  TEMPERATURE:  Temp  Av °F (37.2 °C)  Min: 99 °F (37.2 °C)  Max: 99 °F (37.2 °C)  RESPIRATIONS RANGE: Resp  Av  Min: 18  Max: 18  PULSE OXIMETRY RANGE: No data recorded  PULSE RANGE: Pulse  Av  Min: 96  Max: 96  BLOOD PRESSURE RANGE: Systolic (99PVZ), SEGUNDO:164 , Min:149 , OX   ; Diastolic (38LWL), RMV:61, Min:84, Max:84    I/O (24Hr): No intake or output data in the 24 hours ending 22 1354  Objective  Labs/Imaging/Diagnostics    Labs:  CBC:No results for input(s): WBC, RBC, HGB, HCT, MCV, RDW, PLT, HGBEXT, HCTEXT, PLTEXT in the last 72 hours. CHEMISTRIES:No results for input(s): NA, K, CL, CO2, BUN, CA, PHOS, MG in the last 72 hours. No lab exists for component: CREATININE, GLUCOSEPT/INR:No results for input(s): INR, INREXT in the last 72 hours. No lab exists for component: PROTIME  APTT:No results for input(s): APTT in the last 72 hours. LIVER PROFILE:No results for input(s): AST, ALT in the last 72 hours. No lab exists for component: BILIDIR, BILITOT, ALKPHOS  No results found for: ALT, AST, GGT, GGTP, AP, APIT, APX, CBIL, TBIL, TBILI    Imaging Last 24 Hours:  No results found.   Assessment//Plan   Active Problems: Psychosis ([facility] (1/5/2022)      Vesta ([facility] (1/5/2022)      Assessment & Plan  Bipolar disorder with psychotic features and delusions  Increase Seroquel to 200 mg at bedtime to address delusions and insomnia  TDO hearing on 1/10/2022      Current Facility-Administered Medications:     QUEtiapine (SEROquel) tablet 100 mg, 100 mg, Oral, QHS, Jesenia Leal MD, 100 mg at 01/08/22 2222    multivitamin with folic acid (ONE DAILY WITH FOLIC ACID) tablet 1 Tablet, 1 Tablet, Oral, DAILY, Jesenia Leal MD, 1 Tablet at 01/09/22 0805    lithium carbonate tablet 300 mg, 300 mg, Oral, TID, Jesenia Leal MD, 300 mg at 01/09/22 0805    hydrOXYzine HCL (ATARAX) tablet 50 mg, 50 mg, Oral, TID PRN, Rebekah Blanc MD, 50 mg at 01/08/22 2128    traZODone (DESYREL) tablet 50 mg, 50 mg, Oral, QHS PRN, Rebekah Blanc MD, 50 mg at 01/08/22 2128    acetaminophen (TYLENOL) tablet 650 mg, 650 mg, Oral, Q4H PRN, Rebekah Balnc MD, 650 mg at 01/08/22 1304    magnesium hydroxide (MILK OF MAGNESIA) 400 mg/5 mL oral suspension 30 mL, 30 mL, Oral, DAILY PRN, Romie Larios MD    haloperidoL (HALDOL) tablet 5 mg, 5 mg, Oral, Q8H PRN, Fabio Frazier MD, 5 mg at 01/09/22 0805    haloperidol lactate (HALDOL) injection 5 mg, 5 mg, IntraMUSCular, Q8H PRN, Jesenia Leal MD    LORazepam (ATIVAN) tablet 1 mg, 1 mg, Oral, Q8H PRN, Jesenia Leal MD, 1 mg at 01/09/22 0919    LORazepam (ATIVAN) injection 1 mg, 1 mg, IntraMUSCular, Q8H PRN, Fabio Frazier MD  Electronically signed by Jose Shoemaker MD on 1/9/2022 at 1:54 PM

## 2022-01-09 NOTE — BH NOTES
Patient spent time pacing \"to blow off some steam\". Slept briefly in intervals. Up walking, then in day room standing on chair trying to cover the sensors in the dayroom with paper spitwads and in hallways with socks, to make it harder for the people to triangulate his whereabouts. Had his boxers on over his shorts.   Had PRN's for sleep and psychosis symptoms

## 2022-01-10 PROCEDURE — 74011250637 HC RX REV CODE- 250/637: Performed by: PSYCHIATRY & NEUROLOGY

## 2022-01-10 PROCEDURE — 65220000003 HC RM SEMIPRIVATE PSYCH

## 2022-01-10 RX ADMIN — LITHIUM CARBONATE 300 MG: 300 TABLET ORAL at 16:16

## 2022-01-10 RX ADMIN — MULTIVITAMIN TABLET 1 TABLET: TABLET at 08:06

## 2022-01-10 RX ADMIN — QUETIAPINE FUMARATE 200 MG: 100 TABLET ORAL at 21:56

## 2022-01-10 RX ADMIN — ACETAMINOPHEN 650 MG: 325 TABLET ORAL at 06:39

## 2022-01-10 RX ADMIN — LITHIUM CARBONATE 300 MG: 300 TABLET ORAL at 21:56

## 2022-01-10 RX ADMIN — LORAZEPAM 1 MG: 1 TABLET ORAL at 21:56

## 2022-01-10 RX ADMIN — LITHIUM CARBONATE 300 MG: 300 TABLET ORAL at 08:06

## 2022-01-10 RX ADMIN — TRAZODONE HYDROCHLORIDE 100 MG: 50 TABLET ORAL at 21:57

## 2022-01-10 NOTE — PROGRESS NOTES
Patient participated in [address] [place] [address] on 1/10/2022. Rev.  Joseph Tolliver MDiv, [facility], [facility] paging service: [phone])

## 2022-01-10 NOTE — PROGRESS NOTES
Problem: Falls - Risk of  Goal: *Absence of Falls  Description: Document Yosvany Herediaer Fall Risk and appropriate interventions in the flowsheet.   Outcome: Progressing Towards Goal  Note: Fall Risk Interventions:            Medication Interventions: Teach patient to arise slowly                   Problem: Depressed Mood (Adult/Pediatric)  Goal: *STG: Verbalizes anger, guilt, and other feelings in a constructive manor  Outcome: Progressing Towards Goal     Problem: Depressed Mood (Adult/Pediatric)  Goal: *STG: Attends activities and groups  Outcome: Progressing Towards Goal

## 2022-01-10 NOTE — GROUP NOTE
IP  GROUP DOCUMENTATION INDIVIDUAL                                                                          Group Therapy Note    Date: 1/10/2022    Group Start Time: 0930  Group End Time: 3542  Group Topic: Education Group - Inpatient    SRM 2  NON ACUTE    Christian Wright    IP 1[address] [facility] GROUP DOCUMENTATION GROUP    Group Therapy Note    Setting Goals/Facilitated discussion focused on stepping up to a better you by taking steps to improve in their well-being and identifying goals that would help to ensure follow-up    Attendees: 6/13         Attendance: Attended    Patient's Goal:  Attend group daily     Interventions/techniques: Informed and Supported    Follows Directions: Followed directions    Interactions: Interacted appropriately    Mental Status: Calm    Behavior/appearance: Cooperative    Goals Achieved: Able to engage in interactions, Able to listen to others and Able to self-disclose      Additional Notes:  Receptive to information discussed and engaged. Pt shared prior to admission on a scale of 0/poor-10/good he was taking care of himself at a \"1\".  Pt shared \"it was because of stupid distractions and not taking his medications\"  Pt shared his goal is to \"take his medications/vitamins regularly and ignore distractions so he can be taking care of himself at a \"10\"      Waleska Villa, [address]

## 2022-01-10 NOTE — BH NOTES
Patient visible on unit. Alert & oriented. Pleasant on approach. Accepts medications as scheduled with no side effects reported. Appearance is neat & clean. Received up to 15 days commitment at [facility] hearing, today. He attends group sessions and interacts appropriately with staff & peers. No physical complaints. Encouraged to seek support from staff as needed. Will continue to monitor on close observation to ensure patient safety and follow treatment plan as written.

## 2022-01-10 NOTE — PROGRESS NOTES
visit for routine follow up. Met patient in group room prior to the start of group and prior to arrival of others. Provided spiritual presence and listening as he spoke of his present thoughts, feelings, and concerns. Spoke about the events leading to this admission. Also spoke of personal issues he considers to be confidential.  His hope is to be able to return home as soon as able. His concern is for his family and he worries about not being able to care for them while here. He appeared comforted and encouraged as a result of this visit and expressed gratitude for th visit. Rev.  Manfred Goff MDiv, Health system, [facility]   paging service: [phone])

## 2022-01-10 NOTE — GROUP NOTE
MARNIE  GROUP DOCUMENTATION INDIVIDUAL                                                                          Group Therapy Note    Date: 1/10/2022    Group Start Time: 1115  Group End Time: 1200  Group Topic: Process Group - Inpatient    SRM 2 BEHA TH ACUTE    Estela West    IP 1150 [facility] GROUP DOCUMENTATION GROUP    Group Therapy Note    Attendees: 10/13    Process: pts were asked to share what brought them to the hospital as a check in question. Pts were then encouraged to share their thoughts on boundaries and how to set healthy ones. Pts provided positive feedback to one another. Pts were then asked to reflect on group         Attendance: Attended    Patient's Goal:  Attend and participate in groups     Interventions/techniques: Validated, Promoted peer support, Provide feedback and Supported    Follows Directions: Followed directions    Interactions: Interacted appropriately    Mental Status: Calm, Congruent and Flat    Behavior/appearance: Agitated, Attentive, Neatly groomed and Withdrawn/quiet    Goals Achieved: Able to engage in interactions, Able to listen to others, Able to reflect/comment on own behavior and Able to manage/cope with feelings      Additional Notes:  Pt was quiet throughout group.  Pt appeared agitated and stated he does not need to be in the hospital. Pt is making progress towards goals by attending and participating in group    [employer]

## 2022-01-10 NOTE — PROGRESS NOTES
Shift Note:    Problem: Depressed Mood (Adult/Pediatric)  Goal: *STG: Demonstrates reduction in symptoms and increase in insight into coping skills/future focused  Outcome: Progressing Towards Goal  Goal: *STG: Remains safe in hospital  Outcome: Progressing Towards Goal  Goal: *STG: Complies with medication therapy  Outcome: Progressing Towards Goal     Mr. Colonel Beckett was alert and oriented times 4 at the onset of the shift. He was in the dayroom watching TV and associating well with his peers. He was observed with bizarre hand movements and spoke with looseness of associations in a flight of ideas. His speech was a normal rate however and he presented well kempt and slightly anxious. He could not rate his anxiety but requested a PRN to help. He received Ativan as ordered. He spoke with the independent evaluator and expressed himself with smiles and ws easily directed. He is currently in bed with his eyes closed with even and unlabored breathing. Staff will continue to monitor on every 15 minute checks throughout the evening.

## 2022-01-10 NOTE — BSMART NOTE
TDO DISPOSITION    Pt committed up to 15 days on 1/10/22. Miguelito Fairbanks.  Represented by Omid Carvalho

## 2022-01-10 NOTE — PROGRESS NOTES
Progress Note  Date:1/10/2022       Room:[facility]  Patient Name:Avni Flores     YOB: 1960     Age:61 y.o. Subjective    Subjective   Mr. Amy De Souza is a 57-year-old male who presents with psychosis and vesta. He states that he is feeling better today and denied any thoughts of self harm. He was inquiring about his hearing today, and he asked if we could get his psychologist from [facility] involved. He is still eager to get out of here, but he was cooperative upon examination. Review of Systems  Objective         Vitals Last 24 Hours:  TEMPERATURE:  Temp  Av °F (37.2 °C)  Min: 99 °F (37.2 °C)  Max: 99 °F (37.2 °C)  RESPIRATIONS RANGE: Resp  Av  Min: 20  Max: 20  PULSE OXIMETRY RANGE: No data recorded  PULSE RANGE: Pulse  Av  Min: 99  Max: 99  BLOOD PRESSURE RANGE: Systolic (40NGI), BXD:839 , Min:154 , GEK:479   ; Diastolic (09TIH), HYM:52, Min:86, Max:86    I/O (24Hr): No intake or output data in the 24 hours ending 01/10/22 1043  Objective  Labs/Imaging/Diagnostics    Labs:  CBC:No results for input(s): WBC, RBC, HGB, HCT, MCV, RDW, PLT, HGBEXT, HCTEXT, PLTEXT in the last 72 hours. CHEMISTRIES:No results for input(s): NA, K, CL, CO2, BUN, CA, PHOS, MG in the last 72 hours. No lab exists for component: CREATININE, GLUCOSEPT/INR:No results for input(s): INR, INREXT in the last 72 hours. No lab exists for component: PROTIME  APTT:No results for input(s): APTT in the last 72 hours. LIVER PROFILE:No results for input(s): AST, ALT in the last 72 hours. No lab exists for component: BILIDIR, BILITOT, ALKPHOS  No results found for: ALT, AST, GGT, GGTP, AP, APIT, APX, CBIL, TBIL, TBILI    Imaging Last 24 Hours:  No results found. Assessment//Plan   Active Problems:    Psychosis ([address] (2022)      Vesta ([facility] (2022)      Assessment & Plan  TDO hearingn today.    Continue medications    Current Facility-Administered Medications:    QUEtiapine (SEROquel) tablet 200 mg, 200 mg, Oral, QHS, Jesenia Leal MD, 200 mg at 01/09/22 2119    traZODone (DESYREL) tablet 100 mg, 100 mg, Oral, QHS PRN, Jesenia Leal MD    multivitamin with folic acid (ONE DAILY WITH FOLIC ACID) tablet 1 Tablet, 1 Tablet, Oral, DAILY, Jesenia Leal MD, 1 Tablet at 01/10/22 0806    lithium carbonate tablet 300 mg, 300 mg, Oral, TID, Jesenia Leal MD, 300 mg at 01/10/22 1616    hydrOXYzine HCL (ATARAX) tablet 50 mg, 50 mg, Oral, TID PRN, Ja Franco MD, 50 mg at 01/08/22 2128    acetaminophen (TYLENOL) tablet 650 mg, 650 mg, Oral, Q4H PRN, Ja Franco MD, 650 mg at 01/10/22 6454    magnesium hydroxide (MILK OF MAGNESIA) 400 mg/5 mL oral suspension 30 mL, 30 mL, Oral, DAILY PRN, Romie Larios MD    haloperidoL (HALDOL) tablet 5 mg, 5 mg, Oral, Q8H PRN, Jules Fuentes MD, 5 mg at 01/09/22 0805    haloperidol lactate (HALDOL) injection 5 mg, 5 mg, IntraMUSCular, Q8H PRN, Jesenia Leal MD    LORazepam (ATIVAN) tablet 1 mg, 1 mg, Oral, Q8H PRN, Jesenia Leal MD, 1 mg at 01/09/22 2119    LORazepam (ATIVAN) injection 1 mg, 1 mg, IntraMUSCular, Q8H PRN, Jules Fuentes MD  Electronically signed by Jim Castro MD on 1/10/2022 at 10:43 AM

## 2022-01-10 NOTE — GROUP NOTE
IP  GROUP DOCUMENTATION INDIVIDUAL                                                                          Group Therapy Note    Date: 1/10/2022    Group Start Time: 1320  Group End Time: 6786  Group Topic: Recreational/Music Therapy    SRM 2  NON ACUTE    Edis Ryan    IP [address]150 [facility] GROUP DOCUMENTATION GROUP    Group Therapy Note    Facilitated leisure skills group as a positive way to cope and manage mood through music and art task    Attendees: 8/14         Attendance: Attended    Patient's Goal:  Attend group daily     Interventions/techniques: Art integration and Supported    Follows Directions: Followed directions    Interactions: Interacted appropriately    Mental Status: Calm    Behavior/appearance: Cooperative    Goals Achieved: Able to engage in interactions and Able to listen to others      Additional Notes:  Receptive to listening to music and a song. Decline to work on leisure task.  Interacted with peers and staff    Rl Ricketts

## 2022-01-11 PROCEDURE — 74011250637 HC RX REV CODE- 250/637: Performed by: PSYCHIATRY & NEUROLOGY

## 2022-01-11 PROCEDURE — 65220000003 HC RM SEMIPRIVATE PSYCH

## 2022-01-11 RX ADMIN — LITHIUM CARBONATE 300 MG: 300 TABLET ORAL at 16:52

## 2022-01-11 RX ADMIN — LITHIUM CARBONATE 300 MG: 300 TABLET ORAL at 21:24

## 2022-01-11 RX ADMIN — MULTIVITAMIN TABLET 1 TABLET: TABLET at 09:26

## 2022-01-11 RX ADMIN — LITHIUM CARBONATE 300 MG: 300 TABLET ORAL at 09:26

## 2022-01-11 RX ADMIN — QUETIAPINE FUMARATE 200 MG: 100 TABLET ORAL at 21:24

## 2022-01-11 RX ADMIN — LORAZEPAM 1 MG: 1 TABLET ORAL at 21:42

## 2022-01-11 NOTE — GROUP NOTE
IP  GROUP DOCUMENTATION INDIVIDUAL                                                                          Group Therapy Note    Date: 1/11/2022    Group Start Time: 3520  Group End Time: 1400  Group Topic: Recreational/Music Therapy    SRM 2  NON ACUTE    Cali Licona    IP 1150 [facility] GROUP DOCUMENTATION GROUP    Group Therapy Note    Facilitated leisure skills group to reinforce positive coping through music, social interaction, group activities and arts/crafts    Attendees: 7/13         Attendance: Attended    Patient's Goal:  Attend group daily     Interventions/techniques: Art integration and Supported    Follows Directions: Followed directions    Interactions: Interacted appropriately    Mental Status: Calm    Behavior/appearance: Cooperative    Goals Achieved: Able to engage in interactions and Able to listen to others      Additional Notes:  Receptive to listening to music and a song he selected. Decline to work on leisure task.  Interacted when prompted    Codey Viera, [address]

## 2022-01-11 NOTE — PROGRESS NOTES
Progress Note  Date:2022       Room:[facility] [place] 2]  Patient Name:Avni Flores     YOB: 1960     Age:61 y.o. Subjective    Subjective   Mr. Zhen Mendoza states that he feels well and has been sleeping better. He expressed a lot of concern regarding his TDO placed yesterday 1/10 that is for 15 days. He stated that he would like to talk to his  again to appeal this. When inquired about the \"lasers\" he previously mentioned, he stated that the nurse told him that maintenance will come and fix the laser. He states that as soon as maintenance fixes the laser he will feel better. He additionally expressed frustration with how difficult it has been to reach out to his family. Overall he states no HI/SI. Patient is oriented to person-place-time. He was slightly hostile today and defensive. His speech is unremarkable. His thought process is linear but illogical. His thought content is delusional and obsessive, especially regarding the \"laser\". Overall he is attentive. Review of Systems  Objective         Vitals Last 24 Hours:  TEMPERATURE:  Temp  Av.3 °F (36.8 °C)  Min: 98.2 °F (36.8 °C)  Max: 98.4 °F (36.9 °C)  RESPIRATIONS RANGE: Resp  Av  Min: 18  Max: 18  PULSE OXIMETRY RANGE: SpO2  Av %  Min: 100 %  Max: 100 %  PULSE RANGE: Pulse  Av.7  Min: 91  Max: 98  BLOOD PRESSURE RANGE: Systolic (85WVA), MTO:612 , Min:117 , WAK:448   ; Diastolic (74UVL), NNF:99, Min:66, Max:86    I/O (24Hr): No intake or output data in the 24 hours ending 22 1017  Objective  Labs/Imaging/Diagnostics    Labs:  CBC:No results for input(s): WBC, RBC, HGB, HCT, MCV, RDW, PLT, HGBEXT, HCTEXT, PLTEXT in the last 72 hours. CHEMISTRIES:No results for input(s): NA, K, CL, CO2, BUN, CA, PHOS, MG in the last 72 hours. No lab exists for component: CREATININE, GLUCOSEPT/INR:No results for input(s): INR, INREXT in the last 72 hours.     No lab exists for component: PROTIME  APTT:No results for input(s): APTT in the last 72 hours. LIVER PROFILE:No results for input(s): AST, ALT in the last 72 hours. No lab exists for component: BILIDIR, BILITOT, ALKPHOS  No results found for: ALT, AST, GGT, GGTP, AP, APIT, APX, CBIL, TBIL, TBILI    Imaging Last 24 Hours:  No results found.   Assessment//Plan   Active Problems:    Psychosis ([facility] [address] (1/5/2022)      Vesta ([facility] (1/5/2022)      Assessment & Plan   -get a lithium level  -continue medications  -Patient continuing to be more delusional  -Continued  to reach to his primary care other support providers  Plan to reach out to Leo Johnson for further updates and treatment planning  Explore antipsychotic Invega or Geodon or Haldol or Risperdal going delusional thought process      Current Facility-Administered Medications:     ziprasidone (GEODON) capsule 40 mg, 40 mg, Oral, Q12H PRN, Fito Weaver MD    ziprasidone (GEODON) 20 mg in sterile water (preservative free) 1 mL injection, 20 mg, IntraMUSCular, Q12H PRN, Jesenia Leal MD, 20 mg at 01/12/22 1108    QUEtiapine (SEROquel) tablet 200 mg, 200 mg, Oral, QHS, Jesenia Leal MD, 200 mg at 01/12/22 2111    traZODone (DESYREL) tablet 100 mg, 100 mg, Oral, QHS PRN, Jesenia Leal MD, 100 mg at 01/12/22 2111    multivitamin with folic acid (ONE DAILY WITH FOLIC ACID) tablet 1 Tablet, 1 Tablet, Oral, DAILY, Jesenia Leal MD, 1 Tablet at 01/12/22 9538    lithium carbonate tablet 300 mg, 300 mg, Oral, TID, Jesenia Leal MD, 300 mg at 01/12/22 2111    hydrOXYzine HCL (ATARAX) tablet 50 mg, 50 mg, Oral, TID PRN, Kristy Vuong MD, 50 mg at 01/08/22 2128    acetaminophen (TYLENOL) tablet 650 mg, 650 mg, Oral, Q4H PRN, Kristy Vuong MD, 650 mg at 01/12/22 5049    magnesium hydroxide (MILK OF MAGNESIA) 400 mg/5 mL oral suspension 30 mL, 30 mL, Oral, DAILY PRN, Romie Larios MD    LORazepam (ATIVAN) tablet 1 mg, 1 mg, Oral, Q8H PRN, Fito Weaver MD, 1 mg at 01/12/22 2478   LORazepam (ATIVAN) injection 1 mg, 1 mg, IntraMUSCular, Q8H PRN, Otto Fink MD      Electronically signed by Shai Jon MD on 1/11/2022 at 10:17 AM

## 2022-01-11 NOTE — PROGRESS NOTES
BH Shift Note:    Problem: Depressed Mood (Adult/Pediatric)  Goal: *STG: Remains safe in hospital  Outcome: Progressing Towards Goal  Goal: *STG: Complies with medication therapy  Outcome: Progressing Towards Goal     Problem: Psychosis  Goal: *STG: Decreased delusional thinking  Outcome: Not Progressing Towards Goal  Goal: *STG: Remains safe in hospital  Outcome: Progressing Towards Goal  Goal: *STG/LTG: Complies with medication therapy  Outcome: Progressing Towards Goal     Mr. Mandeep Reece was alert and oriented times 4 at the onset of the shift when even and unlabored breathing, frequently pacing the unit. He presented  with pressured speech and a disheveled appearance. He stated that lasers were watching the patients through the window in the day room. He denied thoughts to harm himself or others or any intention to \"escape the unit\". He stated that because of his \"covert activities\" he needs to be monitored by [employer] for his safety\" for the remainder of the 15 days. He requested and accepted medication for his anxiety that he did not rate. He requested medication for sleep. He has insight to his need to take his medications as ordered by the physician. He is currently in bed with his eyes closed with even and unlabored breathing. Staff will continue to monitor on every 15 minute checks.

## 2022-01-11 NOTE — BH NOTES
Pt denies A, D, SI/HI, AVH. Pt has pressured rapid speech. He was fixated on the bulletin board not being flush with the wall, and insisted with this nurse that the broken bulletin board was the reason his friend on the unit was having auditory hallucinations. He stated is was a state secret dating back to [employer]. Jero San. When pt was asked if he knew where he was he stated back accurately that he was at [facility] side on the behavioral health unit, but still continued on about the bulletin board and it was a state secret. Pt is medication compliant, has a good appetite, and is getting adequate sleep.
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
## 2022-01-11 NOTE — BH NOTES
Behavioral Health Treatment Team Note     Patient goal(s) for today: To contact his family  Treatment team focus/goals: To continue group therapy and medication management     Progress note: The pt was presenting as manic, as evidenced by pressured speech and an elevated mood. He was also restless and anxious as well, specifically when speaking about the phones in the dayroom. He said that many of his rights are being violated here, one of which is his privacy while using the phones in the dayroom. He said that the hospital should have dividers up between the phones. This writer validated his feelings. He also complained about his psychiatrist here putting him on so many medications, adding that she is also violating his human rights. He said that he wanted to file a complaint about these things. This writer showed him where the patient advocate number is in the nursing station, which he wrote down. He denied SI/HI and AH/VH's. This writer also told him that she could not locate his psychiatrist in [place] online. He signed a consent form for his wife to be contacted, adding that she would know that information. An inpatient level of care is still necessary due to the pt's continued manic and paranoid presentation. COLLATERAL CONTACT  This writer spoke with the pt's wife over the phone. She explained that the pt has been doing fairly well for the past several years. She explained that his last hospitalization was in either 2011 or 2012. She said that during that time he was hospitalized in [place], although she felt like the doctors were overmedicating him there because he was becoming more delusional, and hallucinating. She said that she transferred him to a hospital in [facility] as a result of this where he ended up clearing up. She said that he was hospitalized in [facility] for a month. She said that she can tell when he is not doing well when he becomes more paranoid.  She said that he thinks people are watching him, and speaks about there being cameras everywhere. She also said that he called her yesterday from this hospital after his hearing, and still sounded delusional. She also said that their relationship has been \"off and on\" due to the pt's infidelity . She also said that he sends these other women money as well. She said that he also drinks heavily, stating that he normally drinks vodka daily. She believes that thinking about his father passing away 3 years ago causes him to become more depressed. She said that he was in a nursing home before he passed. She said that she chooses to continue to care for the pt because he does not have any other family members he is close with. She said that he has two brother who have their own families, who are not really involved with the pt much. She said that the pt normally takes lithium, 300mg, twice a day. LOS:  6  Expected LOS: Committed up to 1/24/22    Insurance info/prescription coverage:  VA Medicare   Date of last family contact:  Spoke with his psychologist Riddhi Canada 1/10/22. Attempted to contact his wife today although was unable to leave a voicemail. Family requesting physician contact today:  no  Discharge plan:   To stabilize the pt's mood and symptoms further   Guns in the home:  no   Outpatient provider(s):  He has an existing psychiatrist in [place]    Participating treatment team members: Tigre Ye, * (assigned SW), Madison Licona [facility]

## 2022-01-11 NOTE — GROUP NOTE
MARNIE  GROUP DOCUMENTATION INDIVIDUAL                                                                          Group Therapy Note    Date: 1/11/2022    Group Start Time: 0933  Group End Time: 1317  Group Topic: Education Group - Inpatient    SRM 2 BH NON ACUTE    Randy Seip    [facility] GROUP DOCUMENTATION GROUP    Group Therapy Note    Facilitated discussion focus on understanding and developing healthy boundaries to improve relationships    Attendees: 9/13         Attendance: Attended    Patient's Goal:  Attend group daily     Interventions/techniques: Informed and Supported    Follows Directions:  Followed directions    Interactions: Interacted appropriately    Mental Status: Calm    Behavior/appearance: Cooperative    Goals Achieved: Able to engage in interactions, Able to listen to others and Able to self-disclose      Additional Notes: Receptive to information discussed on healthy and unhealthy boundaries and was able to share some of them that applied to him such as \"very intolerant of inappropriate behaviors and have limits and recognize what they are\"     Alka Leavitt

## 2022-01-11 NOTE — PROGRESS NOTES
Problem: Falls - Risk of  Goal: *Absence of Falls  Description: Document Darlen Mitchells Fall Risk and appropriate interventions in the flowsheet.   Outcome: Progressing Towards Goal  Note: Fall Risk Interventions:            Medication Interventions: Teach patient to arise slowly                   Problem: Depressed Mood (Adult/Pediatric)  Goal: *STG: Remains safe in hospital  Outcome: Progressing Towards Goal  Goal: *STG: Complies with medication therapy  Outcome: Progressing Towards Goal     Problem: Psychosis  Goal: *STG: Remains safe in hospital  Outcome: Progressing Towards Goal  Goal: *STG/LTG: Complies with medication therapy  Outcome: Progressing Towards Goal

## 2022-01-12 PROCEDURE — 74011250636 HC RX REV CODE- 250/636: Performed by: PSYCHIATRY & NEUROLOGY

## 2022-01-12 PROCEDURE — 74011000250 HC RX REV CODE- 250: Performed by: PSYCHIATRY & NEUROLOGY

## 2022-01-12 PROCEDURE — 74011250637 HC RX REV CODE- 250/637: Performed by: PSYCHIATRY & NEUROLOGY

## 2022-01-12 PROCEDURE — 65220000003 HC RM SEMIPRIVATE PSYCH

## 2022-01-12 RX ORDER — ZIPRASIDONE HYDROCHLORIDE 40 MG/1
40 CAPSULE ORAL
Status: DISCONTINUED | OUTPATIENT
Start: 2022-01-12 | End: 2022-01-24 | Stop reason: HOSPADM

## 2022-01-12 RX ORDER — HALOPERIDOL 5 MG/1
5 TABLET ORAL 2 TIMES DAILY
Status: DISCONTINUED | OUTPATIENT
Start: 2022-01-13 | End: 2022-01-13

## 2022-01-12 RX ADMIN — LITHIUM CARBONATE 300 MG: 300 TABLET ORAL at 16:54

## 2022-01-12 RX ADMIN — ACETAMINOPHEN 650 MG: 325 TABLET ORAL at 02:15

## 2022-01-12 RX ADMIN — LITHIUM CARBONATE 300 MG: 300 TABLET ORAL at 21:11

## 2022-01-12 RX ADMIN — ACETAMINOPHEN 650 MG: 325 TABLET ORAL at 08:28

## 2022-01-12 RX ADMIN — LORAZEPAM 1 MG: 1 TABLET ORAL at 11:08

## 2022-01-12 RX ADMIN — QUETIAPINE FUMARATE 200 MG: 100 TABLET ORAL at 21:11

## 2022-01-12 RX ADMIN — MULTIVITAMIN TABLET 1 TABLET: TABLET at 08:28

## 2022-01-12 RX ADMIN — LITHIUM CARBONATE 300 MG: 300 TABLET ORAL at 08:28

## 2022-01-12 RX ADMIN — TRAZODONE HYDROCHLORIDE 100 MG: 50 TABLET ORAL at 21:11

## 2022-01-12 RX ADMIN — ZIPRASIDONE MESYLATE 20 MG: 20 INJECTION, POWDER, LYOPHILIZED, FOR SOLUTION INTRAMUSCULAR at 11:08

## 2022-01-12 NOTE — PROGRESS NOTES
visit for routine follow up. Met patient in [facility] and provided spiritual presence and listening. Says he feels better, but is a little frustrated that he is not able to contact anyone outside of the hospital, such as family and his . Says he is worried about his wife who is at home struggling with Stage IV cancer and he is worried about her and eager to return home to care for her. Most of this discussion and his concerns appears to be centered on having someone who cares for him, such as his son, close by simply to know that they care. Areli Guevara he hoped that he could talk to his son so that his son could just come to the hospital and, if he couldn't visit him personally, could let him know he is here and in the building just so he would know he is here and that someone who cares about him were close by. When asked if there was anything he needed, he said he would like to be able to call home and ask his family to bring him a change of clothes, including socks and underwear. He appeared comforted and encouraged as a result of this visit and expressed gratitude for this visit. Rev.  Jessica Perez MDiv, [facility], [facility]   paging service: [phone])
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
## 2022-01-12 NOTE — GROUP NOTE
MARNIE  GROUP DOCUMENTATION INDIVIDUAL                                                                          Group Therapy Note    Date: 1/12/2022    Group Start Time: 1115  Group End Time: 3218  Group Topic: Process Group - Inpatient    SRM 2 [facility] ACUTE    Tc Yao Eloise    IP [address] [facility] GROUP DOCUMENTATION GROUP    Group Therapy Note    Attendees: 7/14    Process: Patients first completed a safety plan and discussed warning signs, coping skills, safe environments, etc. Writer then facilitated a discussion regarding personal boundaries and types of boundaries. Writer encouraged pts to participate in processing and role play. Pts were then encouraged to reflect on group and concluded with a grounding exercise. Attendance: Did not attend                Additional Notes:  Pt attended at the beginning of the group but walked out shortly after.      Karena Farnsworth

## 2022-01-12 NOTE — BH NOTES
Pt denies A,D,SI//HI, AVH. Pt states he is just fine but he presents as manic, has rapid pressured speech and is fixated on writing what he believes to be \"official\" documentation on the dining room bulletin board, \"convicting\" Dr. Madison Chaney as to why he is stuck in the facility. Pt is very delusional and bizarre in his thinking. Pt had a misunderstanding with a peer earlier today and had to be medicated with a prn medication but is much calmer this afternoon.

## 2022-01-12 NOTE — PROGRESS NOTES
Problem: Depressed Mood (Adult/Pediatric)  Goal: *STG: Remains safe in hospital  Outcome: Progressing Towards Goal  Goal: *STG: Complies with medication therapy  Outcome: Progressing Towards Goal     Problem: Psychosis  Goal: *STG: Remains safe in hospital  Outcome: Progressing Towards Goal  Goal: *STG/LTG: Complies with medication therapy  Outcome: Progressing Towards Goal
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
## 2022-01-13 PROCEDURE — 74011250637 HC RX REV CODE- 250/637: Performed by: PSYCHIATRY & NEUROLOGY

## 2022-01-13 PROCEDURE — 36415 COLL VENOUS BLD VENIPUNCTURE: CPT

## 2022-01-13 PROCEDURE — 80178 ASSAY OF LITHIUM: CPT

## 2022-01-13 PROCEDURE — 65220000003 HC RM SEMIPRIVATE PSYCH

## 2022-01-13 RX ORDER — LORAZEPAM 1 MG/1
1 TABLET ORAL
Status: DISCONTINUED | OUTPATIENT
Start: 2022-01-13 | End: 2022-01-17

## 2022-01-13 RX ADMIN — LORAZEPAM 1 MG: 1 TABLET ORAL at 09:21

## 2022-01-13 RX ADMIN — ZIPRASIDONE HYDROCHLORIDE 40 MG: 40 CAPSULE ORAL at 09:21

## 2022-01-13 RX ADMIN — LITHIUM CARBONATE 300 MG: 300 TABLET ORAL at 15:40

## 2022-01-13 RX ADMIN — QUETIAPINE FUMARATE 200 MG: 100 TABLET ORAL at 21:08

## 2022-01-13 RX ADMIN — ACETAMINOPHEN 650 MG: 325 TABLET ORAL at 13:10

## 2022-01-13 RX ADMIN — LITHIUM CARBONATE 300 MG: 300 TABLET ORAL at 09:18

## 2022-01-13 RX ADMIN — MULTIVITAMIN TABLET 1 TABLET: TABLET at 09:18

## 2022-01-13 RX ADMIN — LITHIUM CARBONATE 300 MG: 300 TABLET ORAL at 21:08

## 2022-01-13 NOTE — BH NOTES
Patient refused PO Haldol 5mg. \"That will kill me, it will make my blood pressure drop to zero\". Dr. Lee Mall informed. Medication discontinued. Patient requested ativan and geodon, PO 1mg ativan and 40mg geodon administered.

## 2022-01-13 NOTE — GROUP NOTE
MARNIE LINK GROUP DOCUMENTATION INDIVIDUAL                                                                          Group Therapy Note    Date: 1/13/2022    Group Start Time: 9213  Group End Time: 1400  Group Topic: Recreational/Music Therapy    SRM 2  NON ACUTE    Kyle Reno GROUP DOCUMENTATION GROUP    Group Therapy Note    Facilitated leisure skills group to reinforce positive coping through music, social interaction, group activities and arts/crafts    Attendees: 8/13         Attendance: Attended    Patient's Goal:  Attend group daily     Interventions/techniques: Art integration and Supported    Follows Directions: Followed directions    Interactions: Interacted appropriately    Mental Status: Calm    Behavior/appearance: Cooperative    Goals Achieved: Able to engage in interactions and Able to listen to others      Additional Notes:  Receptive to listening to music. Decline to work on leisure task.  Interacted when prompted    Esther Toledo, [address]

## 2022-01-13 NOTE — GROUP NOTE
IP  GROUP DOCUMENTATION INDIVIDUAL                                                                          Group Therapy Note    Date: 1/13/2022    Group Start Time: 0935  Group End Time: 8177  Group Topic: Education Group - Inpatient    SRM 2  NON ACUTE    Christian Wright    IP [address] [facility] GROUP DOCUMENTATION GROUP    Group Therapy Note    Facilitated group to introduce information focused on the definition, symptoms and common reactions to trauma and ways to cope    Attendees: 6/14         Attendance: Attended    Patient's Goal:  Attend group daily     Interventions/techniques: Informed and Supported    Follows Directions: Followed directions    Interactions: Interacted appropriately    Mental Status: Calm    Behavior/appearance: Cooperative    Goals Achieved: Able to engage in interactions, Able to listen to others and Able to self-disclose      Additional Notes:  Receptive to information discussed and shared he has experienced a distressing event. Left group.  Did not return    Waleska Villa, [address]

## 2022-01-13 NOTE — PROGRESS NOTES
Problem: Depressed Mood (Adult/Pediatric)  Goal: *STG: Remains safe in hospital  Outcome: Progressing Towards Goal     Problem: Depressed Mood (Adult/Pediatric)  Goal: *STG: Demonstrates reduction in symptoms and increase in insight into coping skills/future focused  Outcome: Progressing Towards Goal     Problem: Depressed Mood (Adult/Pediatric)  Goal: *STG: Attends activities and groups  Outcome: Progressing Towards Goal
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
## 2022-01-13 NOTE — BH NOTES
Patient has been visible on the unit, walking around in the hallway, socializing with peers in the day room and watching television. He denies depression, SI, anxiety, SI, HI, AH& VH. He medication compliant. His affect is flat but he animated. He remains on close observation for safety.

## 2022-01-13 NOTE — PROGRESS NOTES
Progress Note  Date:2022       Room:[facility]  Patient Name:Avni Flores     YOB: 1960     Age:61 y.o. Subjective    Subjective   Patient actively continues to be delusional, laser is  burning his feet, his provider is practicing sadistic methods, hospital is experimenting on patient's and committing insurance fraud. He is noted writing on the board in the day room about his delusional thoughts. He is also noted getting into argument with peers easily and ready to fight. He needed to be redirected this morning. Mental status examination-patient received as needed medication and in bed snoring. InSight and judgment and delusional  Review of Systems  Objective         Vitals Last 24 Hours:  TEMPERATURE:  Temp  Av.8 °F (37.1 °C)  Min: 98.8 °F (37.1 °C)  Max: 98.8 °F (37.1 °C)  RESPIRATIONS RANGE: Resp  Av  Min: 18  Max: 20  PULSE OXIMETRY RANGE: No data recorded  PULSE RANGE: Pulse  Av  Min: 85  Max: 95  BLOOD PRESSURE RANGE: Systolic (43VGT), QBL:067 , Min:123 , JNK:082   ; Diastolic (51OXI), DMB:72, Min:76, Max:79    I/O (24Hr): No intake or output data in the 24 hours ending 22  Objective  Labs/Imaging/Diagnostics    Labs:  CBC:No results for input(s): WBC, RBC, HGB, HCT, MCV, RDW, PLT, HGBEXT, HCTEXT, PLTEXT in the last 72 hours. CHEMISTRIES:No results for input(s): NA, K, CL, CO2, BUN, CA, PHOS, MG in the last 72 hours. No lab exists for component: CREATININE, GLUCOSEPT/INR:No results for input(s): INR, INREXT in the last 72 hours. No lab exists for component: PROTIME  APTT:No results for input(s): APTT in the last 72 hours. LIVER PROFILE:No results for input(s): AST, ALT in the last 72 hours. No lab exists for component: BILIDIR, BILITOT, ALKPHOS  No results found for: ALT, AST, GGT, GGTP, AP, APIT, APX, CBIL, TBIL, TBILI    Imaging Last 24 Hours:  No results found.   Assessment//Plan   Active Problems: Psychosis ([facility] (1/5/2022)      Vesta ([facility] (1/5/2022)      Assessment & Plan  Received Geodon 20 mg IM as needed as he continued to escalate with his delusional thought process getting into arguments with peers and becoming increasingly disruptive. spoke with case management and in the treatment team     continued to provide updates to the [employer] and to obtain further collateral from his treatment providers    Collateral obtained from his ex-wife which is limited.     Lithium level    Start Haldol 5 mg p.o. twice daily to address his delusions      Current Facility-Administered Medications:     ziprasidone (GEODON) capsule 40 mg, 40 mg, Oral, Q12H PRN, Otto Fink MD    ziprasidone (GEODON) 20 mg in sterile water (preservative free) 1 mL injection, 20 mg, IntraMUSCular, Q12H PRN, Jesenia Leal MD, 20 mg at 01/12/22 1108    QUEtiapine (SEROquel) tablet 200 mg, 200 mg, Oral, QHS, Jesenia Leal MD, 200 mg at 01/12/22 2111    traZODone (DESYREL) tablet 100 mg, 100 mg, Oral, QHS PRN, Otto Fink MD, 100 mg at 01/12/22 2111    multivitamin with folic acid (ONE DAILY WITH FOLIC ACID) tablet 1 Tablet, 1 Tablet, Oral, DAILY, Jesenia Leal MD, 1 Tablet at 01/12/22 6376    lithium carbonate tablet 300 mg, 300 mg, Oral, TID, Jesenia Leal MD, 300 mg at 01/12/22 2111    hydrOXYzine HCL (ATARAX) tablet 50 mg, 50 mg, Oral, TID PRN, Shruthi Rascon MD, 50 mg at 01/08/22 2128    acetaminophen (TYLENOL) tablet 650 mg, 650 mg, Oral, Q4H PRN, Shruthi Rascon MD, 650 mg at 01/12/22 2887    magnesium hydroxide (MILK OF MAGNESIA) 400 mg/5 mL oral suspension 30 mL, 30 mL, Oral, DAILY PRN, Romie Larios MD    LORazepam (ATIVAN) tablet 1 mg, 1 mg, Oral, Q8H PRN, Otto Fink MD, 1 mg at 01/12/22 1108    LORazepam (ATIVAN) injection 1 mg, 1 mg, IntraMUSCular, Q8H PRN, Otto Fink MD  Electronically signed by Shai Jon MD on 1/12/2022 at 9:51 PM

## 2022-01-13 NOTE — GROUP NOTE
IP  GROUP DOCUMENTATION INDIVIDUAL                                                                          Group Therapy Note    Date: 1/12/2022    Group Start Time: 1845  Group End Time: 1930  Group Topic: Recreational/Music Therapy    SRM 2  NON ACUTE    Erlinda Tomas    IP [address] [facility] GROUP DOCUMENTATION GROUP    Group Therapy Note    Attendees: 7/14    Facilitated structured group to introduce healthy leisure task skill as positive way to cope and manage stress. Attendance: Attended    Patient's Goal:  STG: Attends activities and groups        Interventions/techniques: Art integration and Supported    Follows Directions: Followed directions    Interactions: Interacted appropriately    Mental Status: Calm and Flat    Behavior/appearance: Attentive and Cooperative    Goals Achieved: Able to engage in interactions and Able to listen to others      Additional Notes: Attended group and actively participated. Offered leisure packet. Worked on task in group and  listened to music. Was receptive to intervention and used time constructively.     Mando Palumbo, CTRS

## 2022-01-13 NOTE — GROUP NOTE
MARNIE  GROUP DOCUMENTATION INDIVIDUAL                                                                          Group Therapy Note    Date: 1/13/2022    Group Start Time: 1120  Group End Time: 1200  Group Topic: Process Group - Inpatient    SR[facility] 2 [facility] ACUTE    Kassie Cunningham    IP [address] [facility] GROUP DOCUMENTATION GROUP    Group Therapy Note  The therapist facilitated a process group by encouraging the patient's to share about their relationships with family members. She also encouraged them to provide support and feedback to one another. Attendees: 7         Attendance: Did not attend    Patient's Goal:      Interventions/techniques: Follows Directions:     Interactions:     Mental Status:     Behavior/appearance:     Goals Achieved: Additional Notes: The pt was invited to group but did not attend.      Keven Jolley

## 2022-01-13 NOTE — PROGRESS NOTES
Progress Note  Date:2022       Room:[facility]  Patient Name:Avni Flores     YOB: 1960     Age:61 y.o. Subjective    Subjective   Mr. Parminder garcia continues to be delusional. He states that Haldol \"will kill him\", and he was very upset that it was offered to him 3 times. He states that he \"can control his allergic reaction as well as his own heart rate and his HR will go to 0 if he takes haldol\". He had a notebook full of questions inquiring how to get out of the unit and speak to his , etc. His thought process continues to be illogical. His thought content is delusional and obsessive. He is alert and oriented x3, but is overall very frustrated. Review of Systems  Objective         Vitals Last 24 Hours:  TEMPERATURE:  Temp  Av.8 °F (37.1 °C)  Min: 98.8 °F (37.1 °C)  Max: 98.8 °F (37.1 °C)  RESPIRATIONS RANGE: Resp  Av  Min: 18  Max: 18  PULSE OXIMETRY RANGE: No data recorded  PULSE RANGE: Pulse  Av  Min: 85  Max: 85  BLOOD PRESSURE RANGE: Systolic (72MFY), ELP:749 , Min:123 , JOSSUE:774   ; Diastolic (55TYH), LZC:67, Min:76, Max:76    I/O (24Hr): No intake or output data in the 24 hours ending 22 1134  Objective  Labs/Imaging/Diagnostics    Labs:  CBC:No results for input(s): WBC, RBC, HGB, HCT, MCV, RDW, PLT, HGBEXT, HCTEXT, PLTEXT in the last 72 hours. CHEMISTRIES:No results for input(s): NA, K, CL, CO2, BUN, CA, PHOS, MG in the last 72 hours. No lab exists for component: CREATININE, GLUCOSEPT/INR:No results for input(s): INR, INREXT in the last 72 hours. No lab exists for component: PROTIME  APTT:No results for input(s): APTT in the last 72 hours. LIVER PROFILE:No results for input(s): AST, ALT in the last 72 hours. No lab exists for component: BILIDIR, BILITOT, ALKPHOS  No results found for: ALT, AST, GGT, GGTP, AP, APIT, APX, CBIL, TBIL, TBILI    Imaging Last 24 Hours:  No results found.   Assessment//Plan Active Problems:    Psychosis ([facility] (1/5/2022)      Vesta ([facility] (1/5/2022)      Assessment & Plan  Continue medications and add an antipsychotic  Encourage group  Get lithium levels in the morning    Electronically signed by Lawrence King MD on 1/13/2022 at 11:34 AM

## 2022-01-13 NOTE — PROGRESS NOTES
Problem: Falls - Risk of  Goal: *Absence of Falls  Description: Document [address] Fail Fall Risk and appropriate interventions in the flowsheet.   Outcome: Progressing Towards Goal  Note: Fall Risk Interventions:            Medication Interventions: Teach patient to arise slowly

## 2022-01-13 NOTE — BH NOTES
Patient visible on unit. Alert & oriented. Mood, labile. Re-directed for taunting and antagonizing peers in the hallway. Attends group sessions. Provided with copy of MAR. Patient states he has suggestions for his doctor. Questions when he will be discharged. Denies SI/HI/AVH. Consumes 1000% of meals in the day room. Napped late morning after PRN administration. Encouraged to seek support from staff as needed. Will continue to monitor on close observation to ensure patient safety and follow treatment plan as written.

## 2022-01-14 PROCEDURE — 74011250637 HC RX REV CODE- 250/637: Performed by: PSYCHIATRY & NEUROLOGY

## 2022-01-14 PROCEDURE — 65220000003 HC RM SEMIPRIVATE PSYCH

## 2022-01-14 RX ORDER — OLANZAPINE 5 MG/1
5 TABLET ORAL
Status: DISCONTINUED | OUTPATIENT
Start: 2022-01-14 | End: 2022-01-20

## 2022-01-14 RX ADMIN — OLANZAPINE 5 MG: 5 TABLET, FILM COATED ORAL at 10:26

## 2022-01-14 RX ADMIN — OLANZAPINE 5 MG: 5 TABLET, FILM COATED ORAL at 17:31

## 2022-01-14 RX ADMIN — QUETIAPINE FUMARATE 200 MG: 100 TABLET ORAL at 21:24

## 2022-01-14 RX ADMIN — LITHIUM CARBONATE 300 MG: 300 TABLET ORAL at 10:26

## 2022-01-14 RX ADMIN — ACETAMINOPHEN 650 MG: 325 TABLET ORAL at 04:58

## 2022-01-14 RX ADMIN — LITHIUM CARBONATE 300 MG: 300 TABLET ORAL at 17:30

## 2022-01-14 RX ADMIN — LITHIUM CARBONATE 300 MG: 300 TABLET ORAL at 21:24

## 2022-01-14 RX ADMIN — MULTIVITAMIN TABLET 1 TABLET: TABLET at 10:26

## 2022-01-14 NOTE — GROUP NOTE
IP  GROUP DOCUMENTATION INDIVIDUAL                                                                          Group Therapy Note    Date: 1/14/2022    Group Start Time: 0933  Group End Time: 1020  Group Topic: Education Group - Inpatient    [facility] 2  NON ACUTE    Erinn Gray    IP [address] [facility] GROUP DOCUMENTATION GROUP    Group Therapy Note    Facilitated group to discuss the definition and benefits of mindfulness and meditation. Introduced relaxation technique to practice    Attendees: 9/14         Attendance: Attended    Patient's Goal:  Attend group daily     Interventions/techniques: Informed and Supported    Follows Directions: Followed directions    Interactions: Interacted appropriately    Mental Status: Calm    Behavior/appearance: Cooperative    Goals Achieved: Able to engage in interactions and Able to listen to others      Additional Notes:  Receptive to information discussed. Demonstrated the ability to sit quietly and focus on guided imagery.  Verbalized feeling relaxed    Cathy Grimes, CTRS

## 2022-01-14 NOTE — PROGRESS NOTES
Problem: Falls - Risk of  Goal: *Absence of Falls  Description: Document [place] Fall Risk and appropriate interventions in the flowsheet.   1/14/2022 1157 by Jean Ruiz RN  Outcome: Progressing Towards Goal  Note: Fall Risk Interventions:            Medication Interventions: Teach patient to arise slowly                1/14/2022 1156 by Jean Ruiz RN  Outcome: Progressing Towards Goal  Note: Fall Risk Interventions:            Medication Interventions: Teach patient to arise slowly                   Problem: Depressed Mood (Adult/Pediatric)  Goal: *STG: Remains safe in hospital  1/14/2022 1157 by Jean Ruiz RN  Outcome: Progressing Towards Goal  1/14/2022 1156 by Jean Ruiz, RN  Outcome: Progressing Towards Goal  Goal: *STG: Complies with medication therapy  1/14/2022 1157 by Jean Ruiz RN  Outcome: Progressing Towards Goal  1/14/2022 1156 by Jean Ruiz, RN  Outcome: Progressing Towards Goal     Problem: Psychosis  Goal: *STG: Decreased delusional thinking  Outcome: Not Progressing Towards Goal  Goal: *STG: Remains safe in hospital  Outcome: Progressing Towards Goal  Goal: *STG/LTG: Complies with medication therapy  Outcome: Progressing Towards Goal

## 2022-01-14 NOTE — GROUP NOTE
IP  GROUP DOCUMENTATION INDIVIDUAL                                                                          Group Therapy Note    Date: 1/14/2022    Group Start Time: 1320  Group End Time: 1400  Group Topic: Recreational/Music Therapy    SR[facility] 2  NON ACUTE    Cannon Shoulders    IP [address] [facility] GROUP DOCUMENTATION GROUP    Group Therapy Note    Facilitated leisure skills group to reinforce positive coping through music, social interaction, group activities and arts/crafts    Attendees: 6/12         Attendance: Attended    Patient's Goal:  Attend group daily     Interventions/techniques: Art integration and Supported    Follows Directions: Followed directions    Interactions: Interacted appropriately    Mental Status: Calm    Behavior/appearance: Cooperative    Goals Achieved: Able to engage in interactions and Able to listen to others      Additional Notes:  Receptive to listening to music. Decline to work on leisure task.  Interacted with peers and staff     Shelley Calhoun

## 2022-01-14 NOTE — BH NOTES
Pt denies A, D, SI/HI, AVH. Pt is med compliant, attends group, has a good appetite, and is getting adequate sleep. He appears to be improving slightly in his thought content, although he would not initially get off of the phone when the group room needed to be closed and then he made the statement to another nurse, \"I'll let you hang it up, so you will be the one being recorded on the record as the one who disconnected me from my call. \"  Implying that whoever ended his call held some type of legal responsibility for ending it.

## 2022-01-14 NOTE — BH NOTES
Behavioral Health Treatment Team Note     Patient goal(s) for today: To go home  Treatment team focus/goals: Continue Group therapy and medication management    Progress note: The pt was presenting with a broad affect and congruent mood. He demonstrated marked improvement in his mood evidenced by positive attitude, providing names and numbers of providers, and laughing and smiling appropriately during conversation. When asked about his mood he expressed feeling \"much better\". He denied any depression, SI/HI and AH/VH's. When this writer acknowledged how well he seems to be doing he said, \"I'm not going to wear a mask [to suppress feelings], if I get aggravated, I'll argue with the doctor like I did before. But I know that will probably keep me here longer and I'm trying to get out of here. \"  He demonstrated a quick change in mood but was easily redirected when reminded of his goal and how he has improved over the past couple of days. An inpatient level of care is necessary in order to stabilize the pt further on medications. LOS:  9  Expected LOS: Committed to 1/24/22. Insurance info/prescription coverage:  [employer] Medicare [employer] A[employer]  Date of last family contact:  Spoke with wife 1/11/22  Family requesting physician contact today:  no  Discharge plan:  Stabilize mood and symptoms  Guns in the home:  no   Outpatient provider(s):  Psychiatrist in [place] and Psychologist Dr. Sushila Echevarria in the [facility].     Participating treatment team members: Aidan Burt, * (assigned SW), Karen Villalta LMSW

## 2022-01-14 NOTE — BH NOTES
Patient up in the dayroom at this time. Approachable. Calm,pleasant and cooperative. Denies SI,HI and AVH's. Mumbles things to himself. Mind wonders periodically. RIS  Conversation not clear. Thoughts are tangential. Attends groups. Enjoys watching TV, socializing and walking the hallways. Med-diet compliant. Aware of all evening medications. Continues to be a active part of the [place].

## 2022-01-14 NOTE — PROGRESS NOTES
Problem: Psychosis  Goal: *STG: Remains safe in hospital  Outcome: Progressing Towards Goal     Problem: Psychosis  Goal: *STG/LTG: Complies with medication therapy  Outcome: Progressing Towards Goal     Problem: Psychosis  Goal: *STG/LTG: Demonstrates improved thought patterns as evidenced by logical and coherent speech  Outcome: Progressing Towards Goal   Patient continues to be a part of his treatment. Patient is assessed every shift on mentation,mood and affect, and remains med-compliant.

## 2022-01-14 NOTE — GROUP NOTE
MARNIE  GROUP DOCUMENTATION INDIVIDUAL                                                                          Group Therapy Note    Date: 1/14/2022    Group Start Time: 1115  Group End Time: 1200  Group Topic: Process Group - Inpatient    [facility] 2 [facility] ACUTE    MaximilianMyra snyder    MARNIE [facility] GROUP DOCUMENTATION GROUP    Group Therapy Note    Attendees: 8/14    Writer facilitated a process group. Writer encouraged patients to process feelings regarding growth and plans for after discharge. Pts were encouraged to reflect on various inspirational quotes. Writer concluded group with a grounding exercise. Pts were encouraged to provide feedback regarding the group. Attendance: Attended    Patient's Goal:  To attend and participate in groups daily. Interventions/techniques: Validated, Promoted peer support, Provide feedback and Supported    Follows Directions: Followed directions    Interactions: Interacted appropriately    Mental Status: Calm and Flat    Behavior/appearance: Attentive and Withdrawn/quiet    Goals Achieved: Able to listen to others, Able to reflect/comment on own behavior and Able to receive feedback      Additional Notes:  Pt was quiet and withdrawn overall during group but was able to provide feedback with prompting. Pt was able to listen to others. Pt was able to provide feedback on the inspirational quote and provide an example of setting boundaries with others when discussing his discharge. Pt is making progress in goals by attending and participating in groups daily.     Gaby Bryan

## 2022-01-15 LAB — LITHIUM SERPL-SCNC: 0.54 MMOL/L (ref 0.6–1.2)

## 2022-01-15 PROCEDURE — 65220000003 HC RM SEMIPRIVATE PSYCH

## 2022-01-15 PROCEDURE — 74011250637 HC RX REV CODE- 250/637: Performed by: PSYCHIATRY & NEUROLOGY

## 2022-01-15 RX ADMIN — LORAZEPAM 1 MG: 1 TABLET ORAL at 21:53

## 2022-01-15 RX ADMIN — MULTIVITAMIN TABLET 1 TABLET: TABLET at 08:45

## 2022-01-15 RX ADMIN — OLANZAPINE 5 MG: 5 TABLET, FILM COATED ORAL at 08:45

## 2022-01-15 RX ADMIN — QUETIAPINE FUMARATE 200 MG: 100 TABLET ORAL at 21:53

## 2022-01-15 RX ADMIN — LITHIUM CARBONATE 300 MG: 300 TABLET ORAL at 15:54

## 2022-01-15 RX ADMIN — LITHIUM CARBONATE 300 MG: 300 TABLET ORAL at 21:53

## 2022-01-15 RX ADMIN — OLANZAPINE 5 MG: 5 TABLET, FILM COATED ORAL at 17:23

## 2022-01-15 RX ADMIN — LITHIUM CARBONATE 300 MG: 300 TABLET ORAL at 08:45

## 2022-01-15 RX ADMIN — ACETAMINOPHEN 650 MG: 325 TABLET ORAL at 21:52

## 2022-01-15 NOTE — PROGRESS NOTES
Psychiatric Progress Note    Patient: Bernabe Gunderson MRN: [id number]  SSN: [id number]    YOB: 1960  Age: 64 y.o. Sex: male      Admit Date: 1/4/2022       Subjective: Bernabe Gunderson minimally conversant and somewhat irritable with respect to wanting to participate with interview. No suicidal or homicidal ideations and will continue to follow closely. No aggression or violence. Tolerating medications well. Eating well and sleeping well. Case reviewed with nursing staff and no significant issues or events reported in the last 24 hours. Staff has observed patient interacting on the unit and attending group.     Objective:     Vitals:    01/13/22 1259 01/13/22 1925 01/14/22 0756 01/14/22 1955   BP: 130/77 (!) 150/91 130/81 124/73   Pulse: 100 90 83 93   Resp: 18 20 18 18   Temp: 98.7 °F (37.1 °C) 98.4 °F (36.9 °C) 100.1 °F (37.8 °C) 98.8 °F (37.1 °C)   SpO2:  100%  99%   Weight:       Height:            Mental Status Exam:   Sensorium  Oriented to time, place, and situation   Relations  poorly engages in interview    Eye Contact Appropriate   Appearance:  Appropriate for venue and season   Speech:  Normal rate, tone, volume and pattern   Thought Process:  Intact   Thought Content  endorses delusions, no clear hallucinations   Suicidal ideations No active and contracts for safety   Mood:  Depressed and anxious   Affect:  Flat, and guarded   Memory    Intact   Concentration:  Intact   Insight:   poor   Judgment:  poor     No signs of tardive dyskinesia or extrapyramidal symptoms    MEDICATIONS:  Current Facility-Administered Medications   Medication Dose Route Frequency    OLANZapine (ZyPREXA) tablet 5 mg  5 mg Oral BIDPC    LORazepam (ATIVAN) tablet 1 mg  1 mg Oral Q6H PRN    ziprasidone (GEODON) capsule 40 mg  40 mg Oral Q12H PRN    ziprasidone (GEODON) 20 mg in sterile water (preservative free) 1 mL injection  20 mg IntraMUSCular Q12H PRN    QUEtiapine (SEROquel) tablet 200 mg  200 mg Oral QHS    traZODone (DESYREL) tablet 100 mg  100 mg Oral QHS PRN    multivitamin with folic acid (ONE DAILY WITH FOLIC ACID) tablet 1 Tablet  1 Tablet Oral DAILY    lithium carbonate tablet 300 mg  300 mg Oral TID    acetaminophen (TYLENOL) tablet 650 mg  650 mg Oral Q4H PRN    magnesium hydroxide (MILK OF MAGNESIA) 400 mg/5 mL oral suspension 30 mL  30 mL Oral DAILY PRN    LORazepam (ATIVAN) injection 1 mg  1 mg IntraMUSCular Q8H PRN        DISCUSSION:  Discussed risk and benefits of medication, provided an opportunity to answer any questions, reviewed discharge goals. Lab/Data Review:  No results found for this or any previous visit (from the past 24 hour(s)). Assessment:     Principal Problem:    Psychosis ([facility] (1/5/2022)    Active Problems:    Vesta ([facility] (1/5/2022)        Plan:     Continues on Zyprexa    Continue to participate in the milieu of activities and work towards discharge goals. Contracts for safety and has no immediate requests    Disposition planning with social work with the goal of a transition to an outpatient level of care. Patient would benefit from ongoing care as they have not yet reached their discharge goals are psychotropic medication optimization.     Tentative discharge TBD     Signed By: Erasmo Segovia, PhD, PA-C, PsyCAQ    January 15, 2022

## 2022-01-15 NOTE — PROGRESS NOTES
Problem: Depressed Mood (Adult/Pediatric)  Goal: *STG: Participates in treatment plan  Outcome: Progressing Towards Goal  Goal: *STG: Demonstrates reduction in symptoms and increase in insight into coping skills/future focused  Outcome: Progressing Towards Goal  Goal: *STG: Remains safe in hospital  Outcome: Progressing Towards Goal  Goal: *STG: Complies with medication therapy  Outcome: Progressing Towards Goal     Problem: Anxiety  Goal: *Alleviation of anxiety  Outcome: Progressing Towards Goal     Problem: Psychosis  Goal: *STG: Decreased hallucinations  Outcome: Progressing Towards Goal

## 2022-01-15 NOTE — GROUP NOTE
MARNIE  GROUP DOCUMENTATION INDIVIDUAL                                                                          Group Therapy Note    Date: 1/15/2022    Group Start Time: 0915  Group End Time: 0930  Group Topic: [employer] Meeting    SRM 2  NON ACUTE    Kayley Turk    IP [address] [facility] GROUP DOCUMENTATION GROUP    Group Therapy Note    Attendees:1         Attendance: Attended    Patient's Goal:  To get discharged soon so he can pay his bill. Interventions/techniques: Supported    Follows Directions: Followed directions    Interactions: Interacted appropriately    Mental Status: Calm    Behavior/appearance: Cooperative    Goals Achieved:  Identified feelings      Additional Notes:     Leslie Norwood

## 2022-01-15 NOTE — BH NOTES
Patient visible on unit in the dayroom watching tv. Patient redirected for taunting peer in dayroom. Questioning when he will be discharged stating d/t missing his children and grandchildren. 0600 patient at nursing station stating that he asked for his Lithium level and a COVID blood test to be drawn. Orders reviewed and Lithium order put on the 12th. The lab was phoned and the writer informed that she had 4 patient on [address] and headed this way.

## 2022-01-15 NOTE — PROGRESS NOTES
Problem: Depressed Mood (Adult/Pediatric)  Goal: *STG: Participates in treatment plan  Outcome: Progressing Towards Goal  Goal: *STG: Remains safe in hospital  Outcome: Progressing Towards Goal  Goal: *STG: Complies with medication therapy  Outcome: Progressing Towards Goal     Problem: Anxiety  Goal: *Alleviation of anxiety  Outcome: Progressing Towards Goal     Problem: Psychosis  Goal: *STG: Decreased hallucinations  Outcome: Progressing Towards Goal  Goal: *STG: Decreased delusional thinking  Outcome: Progressing Towards Goal  Pt has accepted and tolerated meds as ordered. Pt denied hallucinations and delusions; without noted behaviors. Pt has appropriately socialized with peers in dayroom. Pt stated he feels he is ready  to be discharged, the people where he work has a plan for his follow-up and he has a agreed to the plan. When asked to discuss the plan, pt declined. Pt cooperative and pleasant with staff during interaction, though mood was not congruent with his behaviors. Pt encouraged to seek staff as needed. Will continue to monitor pt and follow tx plan.

## 2022-01-15 NOTE — GROUP NOTE
IP  GROUP DOCUMENTATION INDIVIDUAL                                                                          Group Therapy Note    Date: 1/15/2022    Group Start Time: 3681  Group End Time: 1400  Group Topic: Recreational/Music Therapy    SRM 2  NON ACUTE    Shefali Aguilar    IP [facility] GROUP DOCUMENTATION GROUP    Group Therapy Note    Facilitated leisure skills group to reinforce positive coping through music, social interaction, group activities and arts/crafts    Attendees: 8/12         Attendance: Attended    Patient's Goal:  Attend group daily     Interventions/techniques: Art integration and Supported    Follows Directions: Followed directions    Interactions: Interacted appropriately    Mental Status: Calm    Behavior/appearance: Cooperative    Goals Achieved: Able to engage in interactions and Able to listen to others      Additional Notes:  Receptive to listening to music. Decline to work on leisure task.  Interacted with peers and staff    Nikita Avila

## 2022-01-15 NOTE — GROUP NOTE
IP  GROUP DOCUMENTATION INDIVIDUAL                                                                          Group Therapy Note    Date: 1/15/2022    Group Start Time: 0933  Group End Time: 5691  Group Topic: Education Group - Inpatient    SRM 2  NON ACUTE    Shelia Palomares    IP [address] [facility] GROUP DOCUMENTATION GROUP    Group Therapy Note    Facilitated discussion focused on being aware of different feelings and their triggers and changes that need to be made to experience more comfortable feelings and less uncomfortable feelings    Attendees: 3/12         Attendance: Attended    Patient's Goal:  Attend group daily     Interventions/techniques: Informed and Supported    Follows Directions: Followed directions    Interactions: Interacted appropriately    Mental Status: Calm    Behavior/appearance: Cooperative    Goals Achieved: Able to engage in interactions, Able to listen to others, Able to self-disclose, Identified feelings and Identified triggers      Additional Notes:  Receptive to information and engaged. Pt shared feeling \"upset\" prior to admission due to \"being locked outside in the cold and no one was understanding him\".  Pt shared \"today he is feeling anxious because of distractions and his stay here is being extended\" Pt shared in order to feel more comfortable feelings he need to \"get out of this hospital and go home\"      Delaney Briceño

## 2022-01-15 NOTE — BH NOTES
PSYCHIATRIC PROGRESS NOTE         Patient Name  Torsten Bennett   Date of Birth 1960   [facility] [id number]   Medical Record Number  [id number]      Age  64 y.o. PCP Unknown, Provider, NERY   Admit date:  1/4/2022    Room Number  233/02  @ [facility]   Date of Service  1/15/2022            HISTORY OF PRESENT ILLNESS/INTERVAL HISTORY:  Patient continues to be delusional and occasionally irritable and argumentative. Multiple delusional statements. He has been more redirectable agitated today. He has been receiving as needed medications. Active suicidal or homicidal ideations voiced            Linus       Patient presents casually dressed and groomed. Often walking the hallway mostly to himself. He gets into argument with certain peers. Gets along with certain peers. Delusional statements relating to COVID-vaccine. Continues to have poor insight but is making some progress being less agitated and compliant with medications. No active SI or HI voiced         VITALS:     Patient Vitals for the past 24 hrs:   Temp Pulse Resp BP SpO2   01/14/22 1955 98.8 °F (37.1 °C) 93 18 124/73 99 %     Wt Readings from Last 3 Encounters:   01/05/22 113.4 kg (250 lb)     Temp Readings from Last 3 Encounters:   01/14/22 98.8 °F (37.1 °C)     BP Readings from Last 3 Encounters:   01/14/22 124/73     Pulse Readings from Last 3 Encounters:   01/14/22 93            DATA     LABORATORY DATA:(reviewed/updated 1/15/2022)  No results found for this or any previous visit (from the past 24 hour(s)). No results found for: VALF2, VALAC, VALP, VALPR, DS6, CRBAM, CRBAMP, CARB2, XCRBAM  Lab Results   Component Value Date/Time    Lithium level 0.54 (L) 01/13/2022 06:40 AM      RADIOLOGY REPORTS:(reviewed/updated 1/15/2022)  No results found.        MEDICATIONS     ALL MEDICATIONS:   Current Facility-Administered Medications   Medication Dose Route Frequency    OLANZapine (ZyPREXA) tablet 5 mg  5 mg Oral BIDPC    LORazepam (ATIVAN) tablet 1 mg  1 mg Oral Q6H PRN    ziprasidone (GEODON) capsule 40 mg  40 mg Oral Q12H PRN    ziprasidone (GEODON) 20 mg in sterile water (preservative free) 1 mL injection  20 mg IntraMUSCular Q12H PRN    QUEtiapine (SEROquel) tablet 200 mg  200 mg Oral QHS    traZODone (DESYREL) tablet 100 mg  100 mg Oral QHS PRN    multivitamin with folic acid (ONE DAILY WITH FOLIC ACID) tablet 1 Tablet  1 Tablet Oral DAILY    lithium carbonate tablet 300 mg  300 mg Oral TID    acetaminophen (TYLENOL) tablet 650 mg  650 mg Oral Q4H PRN    magnesium hydroxide (MILK OF MAGNESIA) 400 mg/5 mL oral suspension 30 mL  30 mL Oral DAILY PRN    LORazepam (ATIVAN) injection 1 mg  1 mg IntraMUSCular Q8H PRN      SCHEDULED MEDICATIONS:   Current Facility-Administered Medications   Medication Dose Route Frequency    OLANZapine (ZyPREXA) tablet 5 mg  5 mg Oral BIDPC    QUEtiapine (SEROquel) tablet 200 mg  200 mg Oral QHS    multivitamin with folic acid (ONE DAILY WITH FOLIC ACID) tablet 1 Tablet  1 Tablet Oral DAILY    lithium carbonate tablet 300 mg  300 mg Oral TID          ASSESSMENT & PLAN     Continue close observation  Discussed meds  Provided support, psycho edu  Started on Zyprexa 5 mg p.o. twice daily and will continue to further titrate. It has been difficult to reach out to his primary psychiatrist we will continue to make effort. I certify that this patient's inpatient psychiatric hospital services continue to be, required for treatment that could reasonably be expected to improve the patient's condition and that the patient continues to need, on a daily basis, active treatment furnished directly by or requiring the supervision of inpatient psychiatric facility personnel. In addition, the hospital records show that services furnished were intensive treatment services.     Signed By:   Edwina Miguel MD  1/15/2022

## 2022-01-16 PROCEDURE — 74011250637 HC RX REV CODE- 250/637: Performed by: PSYCHIATRY & NEUROLOGY

## 2022-01-16 PROCEDURE — 65220000003 HC RM SEMIPRIVATE PSYCH

## 2022-01-16 RX ADMIN — LITHIUM CARBONATE 300 MG: 300 TABLET ORAL at 17:24

## 2022-01-16 RX ADMIN — ACETAMINOPHEN 650 MG: 325 TABLET ORAL at 21:12

## 2022-01-16 RX ADMIN — LITHIUM CARBONATE 300 MG: 300 TABLET ORAL at 21:12

## 2022-01-16 RX ADMIN — OLANZAPINE 5 MG: 5 TABLET, FILM COATED ORAL at 17:24

## 2022-01-16 RX ADMIN — LITHIUM CARBONATE 300 MG: 300 TABLET ORAL at 08:46

## 2022-01-16 RX ADMIN — QUETIAPINE FUMARATE 200 MG: 100 TABLET ORAL at 21:12

## 2022-01-16 RX ADMIN — MULTIVITAMIN TABLET 1 TABLET: TABLET at 08:46

## 2022-01-16 RX ADMIN — OLANZAPINE 5 MG: 5 TABLET, FILM COATED ORAL at 08:46

## 2022-01-16 NOTE — BH NOTES
Patient has been visible on the unit. Mostly in his room socializing with his room mate. He remains superficial about his future plans after discharge. Compliant with medication. He remains on close observation for safety.

## 2022-01-16 NOTE — BH NOTES
DAYSHIFT NOTE:     Patient is up this morning and in the dayroom. Patient is pleasant on approach. Patient interacts well amongst his peers. Patient denies having any depression and or anxiety. Denies SI/HI. Denies AH/VH. Patient is medication compliant. Attends groups. Patient is up for his meals. Close observations continued to ensure patient safety.

## 2022-01-16 NOTE — GROUP NOTE
MARNIE  GROUP DOCUMENTATION INDIVIDUAL                                                                          Group Therapy Note    Date: 1/16/2022    Group Start Time: 5797  Group End Time: 1400  Group Topic: Recreational/Music Therapy    SRM 2  NON ACUTE    Randy Seip    IP [facility] GROUP DOCUMENTATION GROUP    Group Therapy Note    Facilitated leisure skills group to reinforce positive coping through music, social interaction, group activities and arts/crafts    Attendees: 8/12         Attendance: Attended    Patient's Goal:  Attend group daily     Interventions/techniques: Art integration and Supported    Follows Directions: Followed directions    Interactions: Interacted appropriately    Mental Status: Calm    Behavior/appearance: Cooperative    Goals Achieved: Able to engage in interactions and Able to listen to others      Additional Notes:  Receptive to listening to music and a song he selected. Decline to work on leisure task.  Interacted with peers and staff    Alka Leavitt

## 2022-01-16 NOTE — BH NOTES
Pt rec'd Tylenol 650mg po @2152 for temp 100.2, and /89, rec'd Ativan 1mg po @2153, pt denied any physical concerns. AT 2200 /70, temp 98.

## 2022-01-16 NOTE — BH NOTES
Behavioral Health Treatment Team Note     Patient goal(s) for today: 'go back to [place]'   Treatment team focus/goals: Continue Group therapy and medication management    Progress note: The patient presented in his room and a appeared to have done some grooming. He denied SI/HI/AVH at this time and was oriented x2. The patient described his mood as 'pretty good' and presented a calm mood with a congruent affect. He is still presenting with delusions when he speaks, but is able to follow commands and is polite with the writer. An inpatient level of care is necessary in order to stabilize the pt further on medications.       LOS:  11  Expected LOS: Committed to 1/24/22    Insurance info/prescription coverage:  VA Medicare Parts A&B  Date of last family contact:  Spoke with wife 1/11/22  Family requesting physician contact today:  no  Discharge plan:  Stabilize mood and symptoms  Guns in the home:  no   Outpatient provider(s):  Psychiatrist in [place] and Psychologist Dr. Jordana Mcclain in the [employer].     Participating treatment team members: Lubna Muñoz,   [facility], [address] [place] [address]

## 2022-01-17 LAB — LITHIUM SERPL-SCNC: 0.6 MMOL/L (ref 0.6–1.2)

## 2022-01-17 PROCEDURE — 74011250637 HC RX REV CODE- 250/637: Performed by: PSYCHIATRY & NEUROLOGY

## 2022-01-17 PROCEDURE — 65220000003 HC RM SEMIPRIVATE PSYCH

## 2022-01-17 PROCEDURE — 36415 COLL VENOUS BLD VENIPUNCTURE: CPT

## 2022-01-17 PROCEDURE — 80178 ASSAY OF LITHIUM: CPT

## 2022-01-17 RX ADMIN — LITHIUM CARBONATE 300 MG: 300 TABLET ORAL at 21:30

## 2022-01-17 RX ADMIN — ACETAMINOPHEN 650 MG: 325 TABLET ORAL at 11:10

## 2022-01-17 RX ADMIN — MULTIVITAMIN TABLET 1 TABLET: TABLET at 08:31

## 2022-01-17 RX ADMIN — QUETIAPINE FUMARATE 200 MG: 100 TABLET ORAL at 21:30

## 2022-01-17 RX ADMIN — LITHIUM CARBONATE 300 MG: 300 TABLET ORAL at 08:31

## 2022-01-17 RX ADMIN — LITHIUM CARBONATE 300 MG: 300 TABLET ORAL at 17:11

## 2022-01-17 NOTE — PROGRESS NOTES
Problem: Psychosis  Goal: *STG: Remains safe in hospital  Outcome: Progressing Towards Goal     Problem: Psychosis  Goal: *STG/LTG: Complies with medication therapy  Outcome: Progressing Towards Goal     Problem: Psychosis  Goal: *STG/LTG: Demonstrates improved social functioning by responding appropriately to staff  Outcome: Progressing Towards Goal   Patient continues to be med-compliant. Regularly meets with his SW and CM to process his thoughts and feelings. Still has some loose associations-states irrelevant statements.  Tangential.

## 2022-01-17 NOTE — PROGRESS NOTES
Patient alert and oriented x4. Patient denies si/hi/avh. Patient denies anxiety and depression. Patient states he is good. Patient is in milieu interacting with peers and attending groups. Patient has a flat affect and a labile mood. Patient refused zypexa. He accepted all other medications. He is calm and cooperative at this time. Will continue to monitor.

## 2022-01-17 NOTE — PROGRESS NOTES
Patient participated in [address] [place] [address] on 1/17/2022. Rev.  Charley Mccallum MDiv, [facility], [facility] paging service: [address] [place] [address] ([phone])

## 2022-01-17 NOTE — PROGRESS NOTES
Problem: Falls - Risk of  Goal: *Absence of Falls  Description: Document Raymundo Boles Fall Risk and appropriate interventions in the flowsheet.   Outcome: Progressing Towards Goal  Note: Fall Risk Interventions:            Medication Interventions: Teach patient to arise slowly                   Problem: Depressed Mood (Adult/Pediatric)  Goal: *STG: Participates in treatment plan  Outcome: Progressing Towards Goal  Goal: *STG: Remains safe in hospital  Outcome: Progressing Towards Goal  Goal: *STG: Complies with medication therapy  Outcome: Progressing Towards Goal     Problem: Psychosis  Goal: *STG: Decreased hallucinations  Outcome: Progressing Towards Goal  Goal: *STG: Remains safe in hospital  Outcome: Progressing Towards Goal

## 2022-01-17 NOTE — BH NOTES
Patient up in the dayroom at the beginning of the shift. Watching TV and socializing with the other unit peers. Spoke with the patient briefly. Patient continues to have random, disorganized thoughts. Conversation is sometimes non-sensical. Tangential. Eye contact is good. States he still has depression-3/10 and anxiety 5/10. Denies SI,HI and AVH's. However,has been seen smiling to self and talking with self. Walks in room doing hand gestures and having conversations when no one else is around. Appetite is good. Patient states he enjoys the food here\"I like the food\". Tolerates fluids well. Attends PSR. Patient educated on his evening medications. Patient verbalizes understanding. Pain management on board for a toothache. Describes the pain as a dull ache and a 4/10

## 2022-01-17 NOTE — BH NOTES
Behavioral Health Treatment Team Note     Patient goal(s) for today: To go home   Treatment team focus/goals: To continue group therapy and medication management     Progress note: The pt was presenting with a broad affect and congruent mood, although became irritable at times while talking about his treatment here. He denied SI/HI and AH/Vh's. This writer asked him why he has been declining to take some of his medications. He said that they are not typically what he takes from his psychiatrist in [place], who he has been seeing for years. He said that he only takes lithium with her, which has worked well for him in the past. The pt became fixated again of the patient's rights here, stating that his have been violated. This writer spoke with him about the treatment team potentially requesting an order to treat for him due to declining to take certain medications. He said that he would like to request a new psychiatrist as well. An inpatient level of care is necessary in order to stabilize the pt further on medications. LOS:  12  Expected LOS: Committed up to 1/24/22    Insurance info/prescription coverage:  VA Medicare part A and B  Date of last family contact:  Spoke with his wife on 1/11/22  Family requesting physician contact today:  no  Discharge plan:   To stabilize the pt's mood and symptoms further   Guns in the home:  no   Outpatient provider(s):  Psychiatrist in [facility], and  in the [employer]     Participating treatment team members: Radha Davila, * (assigned SW), Gaby Sosa, CYN

## 2022-01-17 NOTE — GROUP NOTE
[facility] GROUP DOCUMENTATION INDIVIDUAL                                                                          Group Therapy Note    Date: 1/17/2022    Group Start Time: 9440  Group End Time: 1400  Group Topic: Recreational/Music Therapy    SRM 2  NON ACUTE    Song Bowman    IP [facility] GROUP DOCUMENTATION GROUP    Group Therapy Note    Facilitated leisure skills group to reinforce positive coping through music, social interaction, group activities and arts/crafts    Attendees: 8/12         Attendance: Attended    Patient's Goal:  Attend group daily    Interventions/techniques: Art integration and Supported    Follows Directions: Followed directions    Interactions: Interacted appropriately    Mental Status: Calm    Behavior/appearance: Cooperative    Goals Achieved: Able to engage in interactions and Able to listen to others      Additional Notes: Receptive to listening to music. Decline to work on leisure task.  Interacted with peers and staff    Leonard Braden

## 2022-01-17 NOTE — GROUP NOTE
MARNIE  GROUP DOCUMENTATION INDIVIDUAL                                                                          Group Therapy Note    Date: 1/17/2022    Group Start Time: 1115  Group End Time: 1200  Group Topic: Process Group - Inpatient    SRM CARE MANAGEMENT    Elke Torres BSW    [facility] GROUP DOCUMENTATION GROUP    Group Therapy Note      The writer had the group complete a worksheet that was centered around goal making for daily living tasks and relationships . Attendees: 8/12         Attendance: Attended    Patient's Goal:  Attend Group     Interventions/techniques: Promoted peer support and Provide feedback    Follows Directions: Followed directions    Interactions: Interacted appropriately    Mental Status: Calm, Delusions, Flat and Happy    Behavior/appearance: Attentive, Caretaking, Cooperative, Motivated and Neatly groomed    Goals Achieved: Able to engage in interactions, Able to listen to others, Able to give feedback to another, Able to reflect/comment on own behavior, Able to receive feedback and Able to self-disclose      Additional Notes: The patient talked about he needs to be less dependent on family and stop letting people walk over him. And to instead use the friends he knows he has around him and put on some 6 packs.      BROOKE Coley

## 2022-01-17 NOTE — GROUP NOTE
IP  GROUP DOCUMENTATION INDIVIDUAL                                                                          Group Therapy Note    Date: 1/17/2022    Group Start Time: 0930  Group End Time: 9086  Group Topic: Education Group - Inpatient    SRM 2  NON ACUTE    Rosemarie Ruff    IP [address] [facility] GROUP DOCUMENTATION GROUP    Group Therapy Note    Facilitated group session focused on introducing information on developing a thought record to help challenge automatic negative thoughts and develop a more accurate view of a situation    Attendees: 6/12         Attendance: Attended    Patient's Goal:  Attend group daily     Interventions/techniques: Informed and Supported    Follows Directions: Followed directions    Interactions: Interacted appropriately    Mental Status: Calm    Behavior/appearance: Cooperative    Goals Achieved: Able to engage in interactions, Able to listen to others and Able to self-disclose      Additional Notes:  Receptive to information discussed on developing a thought record and was able to recognize examples of different negative thoughts.  Pt was able to share a personal example when he was thinking negative about a stressful event and was able to challenge it    Diogo [address]

## 2022-01-18 PROCEDURE — 74011250637 HC RX REV CODE- 250/637: Performed by: PSYCHIATRY & NEUROLOGY

## 2022-01-18 PROCEDURE — 65220000003 HC RM SEMIPRIVATE PSYCH

## 2022-01-18 RX ADMIN — LITHIUM CARBONATE 300 MG: 300 TABLET ORAL at 16:03

## 2022-01-18 RX ADMIN — MULTIVITAMIN TABLET 1 TABLET: TABLET at 08:35

## 2022-01-18 RX ADMIN — LITHIUM CARBONATE 300 MG: 300 TABLET ORAL at 08:34

## 2022-01-18 RX ADMIN — ACETAMINOPHEN 650 MG: 325 TABLET ORAL at 21:11

## 2022-01-18 RX ADMIN — LITHIUM CARBONATE 300 MG: 300 TABLET ORAL at 21:10

## 2022-01-18 RX ADMIN — QUETIAPINE FUMARATE 200 MG: 100 TABLET ORAL at 21:10

## 2022-01-18 NOTE — GROUP NOTE
IP  GROUP DOCUMENTATION INDIVIDUAL                                                                          Group Therapy Note    Date: 1/18/2022    Group Start Time: 1658  Group End Time: 1339  Group Topic: Recreational/Music Therapy    SRM 2  NON ACUTE    [employer]    IP [facility] GROUP DOCUMENTATION GROUP    Group Therapy Note    Facilitated leisure skills group to reinforce positive coping through music, social interaction, group activities and arts/crafts    Attendees: 10/13         Attendance: Attended    Patient's Goal:  Attend group daily     Interventions/techniques: Art integration and Supported    Follows Directions: Followed directions    Interactions: Interacted appropriately    Mental Status: Calm    Behavior/appearance: Cooperative    Goals Achieved: Able to engage in interactions and Able to listen to others      Additional Notes:  Receptive to listening to music and a song he selected. Decline to work on leisure task.  Interacted with peers and staff    Elizabeth Man

## 2022-01-18 NOTE — GROUP NOTE
IP  GROUP DOCUMENTATION INDIVIDUAL                                                                          Group Therapy Note    Date: 1/18/2022    Group Start Time: 0930  Group End Time: 0061  Group Topic: Education Group - Inpatient    SRM 2  NON ACUTE    Rosemarie Ruff    IP [facility] GROUP DOCUMENTATION GROUP    Group Therapy Note    Healthy relationships/Facilitated discussion focused on breaking down our walls and  being able to recognize attitudes and behaviors that may get in the way of forming or maintaining  quality relationships    Attendees: 6/13         Attendance: Attended    Patient's Goal:  Attend group daily     Interventions/techniques: Informed and Supported    Follows Directions: Followed directions    Interactions: Interacted appropriately    Mental Status: Calm    Behavior/appearance: Cooperative    Goals Achieved: Able to engage in interactions, Able to listen to others and Able to self-disclose      Additional Notes:  Receptive to information discussed and engaged.  Pt shared he recognize \"feeling different from others and unrealistic expectations toward self or others\" get in the way of forming or maintaining a quality relationship    Izaguirre Elisabeth, [address]

## 2022-01-18 NOTE — BH NOTES
Pt. Quiet and withdrawn on this shift slept well. Denies Anxiety, depression, SI/HI, hallucinations or pain. Pt. In no distress respirations regular and unlabored. Will continue to round closely Q15 mins per unit protocol.

## 2022-01-18 NOTE — PROGRESS NOTES
Patient alert and oriented x4. Patient denies si/hi/avh. Patient denies anxiety and depression. He is in milieu interacting with peers and attending groups. Patient being sarcastic and taking old trays off the cart and eating them. He is smiling and has a labile mood. Patient refused zyprexa and states he knows when he needs the medication and would like the zyprexa to be PRN. He is calm and cooperative at this time. Will continue to monitor.

## 2022-01-18 NOTE — BH NOTES
Behavioral Health Treatment Team Note     Patient goal(s) for today: to go home  Treatment team focus/goals: To continue group therapy, medication management, and discharge planning     Progress note: The pt was presenting with a broad affect and congruent mood. He said that he is feeling fine today, and denied any symptoms, including SI/HI and AH/VH's. The pt was delusional earlier in group though, speaking about how he has a cure for COVID, and that one of the reasons he would like to be discharged now is to save people. He spoke about how he believes being off of his lithium while in [place], in combination with drinking lots of caffeine, and the fear that he had from [facility] resulted in this current episode. This writer offered positive reinforcement for his insight. She asked him how the hearing went with the  today who issued an order to treat for him. He said that it went fine. He spent the remainder of the conversation talking about how his rights are being violated. He contradicted himself several times. When this writer explained what the order to treat does, and that the staff can give him medications against his will if he is not willing to take them orally he said that he would prefer to take an injection anyway's. Although made several comments about refusing to take the oral form of the medication. An inpatient level of care is necessary in order to stabilize the pt further on medications. COLLATERAL CONTACT  This writer spoke with Jacqueline Jeff, his psychologist from the [employer]. This writer asked if she has gotten any more information on the patient's baseline.  said that she has been speaking with his wife fairly regularly and said that she said that at his baseline the pt is somewhat \"narcassistic\", and can be irritable when challenged.  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   said that his wife does not believe that his delusional thinking is his baseline though, which the pt still exhibits at times.  asked for an update as well, which this writer provided. She said that she is worried about how he will manage flying back to [place]. She said that if he is any behavioral issues on the plane or the airport he would be taken into custody. LOS:  13  Expected LOS: Committed up to 1/24    Insurance info/prescription coverage:  VA Medicare part A and B  Date of last family contact:  Spoke with Casasndra Self today   Family requesting physician contact today:  no  Discharge plan:   To stabilize the pt's mood and symptoms further   Guns in the home:  no   Outpatient provider(s):  Dr.Gramm Shaheed    Participating treatment team members: Horace Gamboa, Christian (assigned SW), Nichole Galan LMSW

## 2022-01-18 NOTE — PROGRESS NOTES
Problem: Falls - Risk of  Goal: *Absence of Falls  Description: Document Seda Embs Fall Risk and appropriate interventions in the flowsheet.   Outcome: Progressing Towards Goal  Note: Fall Risk Interventions:            Medication Interventions: Teach patient to arise slowly                   Problem: Depressed Mood (Adult/Pediatric)  Goal: *STG: Remains safe in hospital  Outcome: Progressing Towards Goal  Goal: *STG: Complies with medication therapy  Outcome: Progressing Towards Goal     Problem: Psychosis  Goal: *STG: Remains safe in hospital  Outcome: Progressing Towards Goal

## 2022-01-18 NOTE — GROUP NOTE
IP  GROUP DOCUMENTATION INDIVIDUAL                                                                          Group Therapy Note    Date: 1/18/2022    Group Start Time: 1115  Group End Time: 1200  Group Topic: Process Group - Inpatient    SRM 2 BEHA HLTH ACUTE    Leonardostanley Richmond    IP 1150 [facility] GROUP DOCUMENTATION GROUP    Group Therapy Note    Therapist facilitated process group to engage patients in discussion regarding thoughts and feelings. Each patient had the opportunity to share and offer feedback to one another. Attendees: 5/13       Attendance: Attended    Patient's Goal:  To attend groups and activities. 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     Denver Kempf

## 2022-01-19 PROCEDURE — 74011250637 HC RX REV CODE- 250/637: Performed by: PSYCHIATRY & NEUROLOGY

## 2022-01-19 PROCEDURE — 65220000003 HC RM SEMIPRIVATE PSYCH

## 2022-01-19 RX ADMIN — MULTIVITAMIN TABLET 1 TABLET: TABLET at 08:13

## 2022-01-19 RX ADMIN — LITHIUM CARBONATE 300 MG: 300 TABLET ORAL at 16:41

## 2022-01-19 RX ADMIN — QUETIAPINE FUMARATE 200 MG: 100 TABLET ORAL at 21:08

## 2022-01-19 RX ADMIN — LITHIUM CARBONATE 300 MG: 300 TABLET ORAL at 21:08

## 2022-01-19 NOTE — BH NOTES
Behavioral Health Treatment Team Note     Patient goal(s) for today: to go home   Treatment team focus/goals: continue group therapy, medication management, discharge planning    Progress note: The pt was presenting with broad affect and congruent mood. He denied SI/HI and AH/Vh's and reported that he was feeling fine. This writer asked him why he had refused to take his lithium this morning. He said that he declined it because he felt like he was getting lithium toxicity. He said that he can tell when he is not feeling well because his back starts to hurt. He said that he later asked nursing staff to start him on it again so that he can catch up. He was calm and polite for the conversation, although said that if he has to stay here longer he will fight back. He also mentioned that he is turning this into a law suite. He was open to speaking to  tomorrow when this writer suggested. AN inpatient level of care is necessary in order to stabilize the pt further on medications and coordinate discharge plans. COLLATERAL   The pt's psychiatrist Inez Sandhoff left this writer a voicemail indicating that when the pt is at his baseline he can be a bit paranoid, and \"hypomanic\" while taking his medications consistently. She said that he has had lots of \"incidents\" with the police and has been hospitalized several times. She said that he normally takes lithium 300mg, twice a day, and Ambien 10mg with her. She said that he is normally able to achieve some level of stability with these medications. LOS:  14  Expected LOS: Committed up to 1/24/22    Insurance info/prescription coverage:  VA Medicare part A and B  Date of last family contact:  Spoke with  1/18/22  Family requesting physician contact today:  no  Discharge plan:   To stabilize further on medications and coordinate outpatient care   Guns in the home:  no   Outpatient provider(s):  Caroline Lynn, and      Participating treatment team members: Saravanan Morton, * (assigned SW), Brandon Braga LMSW

## 2022-01-19 NOTE — PROGRESS NOTES
Problem: Falls - Risk of  Goal: *Absence of Falls  Description: Document Jackeline Acuna Fall Risk and appropriate interventions in the flowsheet.   Outcome: Progressing Towards Goal  Note: Fall Risk Interventions:            Medication Interventions: Teach patient to arise slowly                   Problem: Patient Education: Go to Patient Education Activity  Goal: Patient/Family Education  Outcome: Progressing Towards Goal     Problem: Depressed Mood (Adult/Pediatric)  Goal: *STG: Remains safe in hospital  Outcome: Progressing Towards Goal  Goal: *STG: Complies with medication therapy  Outcome: Progressing Towards Goal     Problem: Psychosis  Goal: *STG: Remains safe in hospital  Outcome: Progressing Towards Goal

## 2022-01-19 NOTE — PROGRESS NOTES
Progress Note  Date:2022       Room:[facility]  Patient Name:Avni Flores     YOB: 1960     Age:61 y.o. Subjective    Subjective Review of Systems   Patient has refused his medications this morning. He states he would refuse medications so that he can get an injection. He presents with some defiance as he was obtaining order to treat. Patient stated initially that he would refuse medications so that we can give him injection. Encouraged to taking his medications. Later agrees to take the medicine will continue to follow-up. Mental status examination-the patient is alert and oriented x3 still impaired insight into his need for medications. He states he did not take his medication because he was having feelings of too much of lithium. He was explained that this lithium level is within therapeutic low normal range. Again encouraged that he taking his medications would be helpful in his discharge planning as well. Objective         Vitals Last 24 Hours:  TEMPERATURE:  Temp  Av.2 °F (36.8 °C)  Min: 98.1 °F (36.7 °C)  Max: 98.3 °F (36.8 °C)  RESPIRATIONS RANGE: Resp  Av  Min: 18  Max: 20  PULSE OXIMETRY RANGE: No data recorded  PULSE RANGE: Pulse  Av  Min: 86  Max: 86  BLOOD PRESSURE RANGE: Systolic (84QYR), CLI:156 , Min:130 , GBZ:036   ; Diastolic (30QUY), UJX:20, Min:78, Max:81    I/O (24Hr): No intake or output data in the 24 hours ending 22 1712  Objective  Labs/Imaging/Diagnostics    Labs:  CBC:No results for input(s): WBC, RBC, HGB, HCT, MCV, RDW, PLT, HGBEXT, HCTEXT, PLTEXT in the last 72 hours. CHEMISTRIES:No results for input(s): NA, K, CL, CO2, BUN, CA, PHOS, MG in the last 72 hours. No lab exists for component: CREATININE, GLUCOSEPT/INR:No results for input(s): INR, INREXT in the last 72 hours. No lab exists for component: PROTIME  APTT:No results for input(s): APTT in the last 72 hours.   LIVER PROFILE:No results for input(s): AST, ALT in the last 72 hours. No lab exists for component: BILIDIR, BILITOT, ALKPHOS  No results found for: ALT, AST, GGT, GGTP, AP, APIT, APX, CBIL, TBIL, TBILI    Imaging Last 24 Hours:  No results found. Assessment//Plan   Principal Problem:    Psychosis ([facility] [address] (1/5/2022)    Active Problems:    Vesta ([facility] (1/5/2022)      Assessment & Plan  encouraged taking his medications. Patient has refused this morning medications.   We will continue to follow      Current Facility-Administered Medications:     OLANZapine (ZyPREXA) tablet 5 mg, 5 mg, Oral, BIDPC, Jesenia Leal MD, 5 mg at 01/16/22 1724    ziprasidone (GEODON) capsule 40 mg, 40 mg, Oral, Q12H PRN, Jesenia Leal MD, 40 mg at 01/13/22 9639    ziprasidone (GEODON) 20 mg in sterile water (preservative free) 1 mL injection, 20 mg, IntraMUSCular, Q12H PRN, Jesenia Leal MD, 20 mg at 01/12/22 1108    QUEtiapine (SEROquel) tablet 200 mg, 200 mg, Oral, QHS, Jesenia Leal MD, 200 mg at 01/18/22 2110    traZODone (DESYREL) tablet 100 mg, 100 mg, Oral, QHS PRN, Jesenia Leal MD, 100 mg at 01/12/22 2111    multivitamin with folic acid (ONE DAILY WITH FOLIC ACID) tablet 1 Tablet, 1 Tablet, Oral, DAILY, Jesenia Leal MD, 1 Tablet at 01/19/22 0813    lithium carbonate tablet 300 mg, 300 mg, Oral, TID, Jesenia Leal MD, 300 mg at 01/19/22 1641    acetaminophen (TYLENOL) tablet 650 mg, 650 mg, Oral, Q4H PRN, Donita Frost MD, 650 mg at 01/18/22 2111    magnesium hydroxide (MILK OF MAGNESIA) 400 mg/5 mL oral suspension 30 mL, 30 mL, Oral, DAILY PRN, Donita Frost MD    Electronically signed by Susan Martinez MD on 1/19/2022 at 5:12 PM

## 2022-01-19 NOTE — GROUP NOTE
MARNIE  GROUP DOCUMENTATION INDIVIDUAL                                                                          Group Therapy Note    Date: 1/19/2022    Group Start Time: 1115  Group End Time: 1200  Group Topic: Education Group - Inpatient    SRM 2 BEHA TH ACUTE    Myra Yao    IP 1150 [facility] GROUP DOCUMENTATION GROUP    Group Therapy Note    Attendees: 10/14    Writer facilitated a psychoeducational group regarding safety planning. Writer had pts discuss warning signs, coping skills, and identify supports. Pts were asked to fill out their personal safety plan and asked to share and provide feedback. Attendance: Attended    Patient's Goal:  To attend groups and activities daily    Interventions/techniques: Other Safety Planning    Follows Directions: Followed directions    Interactions: Interacted appropriately    Mental Status: Calm, Delusions and Flat    Behavior/appearance: Attentive, Cooperative and Withdrawn/quiet    Goals Achieved: Able to engage in interactions, Able to listen to others, Able to give feedback to another, Able to receive feedback, Able to self-disclose, Discussed discharge plans and Discussed safety plan      Additional Notes:  Pt was cooperative and able to be re-directed. Pt was able to fill out safety plan and self-disclose various coping skills. Pt was able to recognize safety planning as \"important. \"    Berkowitz Code

## 2022-01-19 NOTE — PROGRESS NOTES
Problem: Depressed Mood (Adult/Pediatric)  Goal: *STG: Demonstrates reduction in symptoms and increase in insight into coping skills/future focused  Outcome: Progressing Towards Goal  Goal: *STG: Complies with medication therapy  Outcome: Progressing Towards Goal     Problem: Anxiety  Goal: *Alleviation of anxiety  Outcome: Progressing Towards Goal

## 2022-01-19 NOTE — PROGRESS NOTES
Progress Note  Date:2022       Room:[facility]  Patient Name:Avni Flores     YOB: 1960     Age:61 y.o. Subjective    Subjective  Patient continues to be manic, random delusional statements. Talks about having known ways to cure COVID and has taken by 5 COVID-vaccine's. He continues to make grandiose statements often argumentative and irritable. Refusing medications. Poor insight into his illness. Review of Systems  Objective         Vitals Last 24 Hours:  TEMPERATURE:  Temp  Av.6 °F (37 °C)  Min: 98.1 °F (36.7 °C)  Max: 99.5 °F (37.5 °C)  RESPIRATIONS RANGE: Resp  Av  Min: 18  Max: 20  PULSE OXIMETRY RANGE: SpO2  Av %  Min: 99 %  Max: 99 %  PULSE RANGE: Pulse  Av.3  Min: 86  Max: 91  BLOOD PRESSURE RANGE: Systolic (89DWR), WIM:851 , Min:121 , PQI:015   ; Diastolic (71EWV), CHR:65, Min:76, Max:81    I/O (24Hr): No intake or output data in the 24 hours ending 22 2230  Objective  Labs/Imaging/Diagnostics    Labs:  CBC:No results for input(s): WBC, RBC, HGB, HCT, MCV, RDW, PLT, HGBEXT, HCTEXT, PLTEXT in the last 72 hours. CHEMISTRIES:No results for input(s): NA, K, CL, CO2, BUN, CA, PHOS, MG in the last 72 hours. No lab exists for component: CREATININE, GLUCOSEPT/INR:No results for input(s): INR, INREXT in the last 72 hours. No lab exists for component: PROTIME  APTT:No results for input(s): APTT in the last 72 hours. LIVER PROFILE:No results for input(s): AST, ALT in the last 72 hours. No lab exists for component: BILIDIR, BILITOT, ALKPHOS  No results found for: ALT, AST, GGT, GGTP, AP, APIT, APX, CBIL, TBIL, TBILI    Imaging Last 24 Hours:  No results found. Assessment//Plan   Principal Problem:    Psychosis ([address] (2022)    Active Problems:    Vesta ([facility] (2022)      Assessment & Plan  Patient is refusing olanzapine. Lithium level was 0.6.   Patient needs a second line of medications to address his underlying delusions and vero  Patient has been refusing  To obtain an order to treat      Current Facility-Administered Medications:     OLANZapine (ZyPREXA) tablet 5 mg, 5 mg, Oral, BIDPC, Jesenia Leal MD, 5 mg at 01/16/22 1724    ziprasidone (GEODON) capsule 40 mg, 40 mg, Oral, Q12H PRN, Jesenia Leal MD, 40 mg at 01/13/22 7539    ziprasidone (GEODON) 20 mg in sterile water (preservative free) 1 mL injection, 20 mg, IntraMUSCular, Q12H PRN, Jesenia Leal MD, 20 mg at 01/12/22 1108    QUEtiapine (SEROquel) tablet 200 mg, 200 mg, Oral, QHS, Jesenia Leal MD, 200 mg at 01/18/22 2110    traZODone (DESYREL) tablet 100 mg, 100 mg, Oral, QHS PRN, Jesenia Leal MD, 100 mg at 01/12/22 2111    multivitamin with folic acid (ONE DAILY WITH FOLIC ACID) tablet 1 Tablet, 1 Tablet, Oral, DAILY, Jesenia Leal MD, 1 Tablet at 01/18/22 0212    lithium carbonate tablet 300 mg, 300 mg, Oral, TID, Jesenia Leal MD, 300 mg at 01/18/22 2110    acetaminophen (TYLENOL) tablet 650 mg, 650 mg, Oral, Q4H PRN, Dottie Bryan MD, 650 mg at 01/18/22 2111    magnesium hydroxide (MILK OF MAGNESIA) 400 mg/5 mL oral suspension 30 mL, 30 mL, Oral, DAILY PRN, Dottie Bryan MD  Electronically signed by Eugenia Joe MD on 1/18/2022 at 10:30 PM

## 2022-01-19 NOTE — GROUP NOTE
MARNIE  GROUP DOCUMENTATION INDIVIDUAL                                                                          Group Therapy Note    Date: 1/19/2022    Group Start Time: 4640  Group End Time: 1400  Group Topic: Process Group - Inpatient    SRM 2 BEHA HLTH ACUTE    Estela West; Myra Yao    IP 1150 [facility] GROUP DOCUMENTATION GROUP    Group Therapy Note    Attendees: 8/14    Process: pts were asked to share how they are doing as a check in question. Writer then informed pt of the 'flip your lid' model and went over how trauma interacts with the brain and how triggers work. Pts were encouraged to share their personal experiences with trauma and what they have learned to cope. Pts then discussed what healthy vs unhealthy boundaries are. Pts were then encouraged to write a positive thing about themselves on the board and reflect on group         Attendance: Attended    Patient's Goal:  Attend activities and group    Interventions/techniques: Validated, Promoted peer support, Provide feedback and Supported    Follows Directions: Followed directions    Interactions: Interacted appropriately    Mental Status: Calm, Congruent, Flat and Worried    Behavior/appearance: Attentive, Neatly groomed and Withdrawn/quiet    Goals Achieved: Able to engage in interactions, Able to listen to others and Able to give feedback to another      Additional Notes:  Pt was quiet throughout most of group but would interact with peers and provide them feedback. Pt said it is important for him to focus on himself.  Pt is making progress towards goals by attending and participating in group    Keshia Winkler

## 2022-01-19 NOTE — BH NOTES
Patient alert and verbal. Denies SI/HI. Denies AH/VH. Denies anxiety and depression. Mood euthymic. Complained of a toothache 3/10 on the left side of his mouth. Medicated x1 with prn tylenol with effective results. Visible on unit. Interacts well with peers. Able to make needs known. Takes scheduled meds without difficulty. Up in the 0300 hour standing/pacing in room. Q 15 minute checks continue for safety.

## 2022-01-19 NOTE — PROGRESS NOTES
Patient alert and oriented x4. Patient denies si/hi/avh. Patient denies anxiety and depression. He is in milieu interacting with peers and attending groups. Patient is refusing lithium and zyprexa, Dr. Brenda Almazan aware. Patient is in dayroom calm and cooperative at this time. Will continue to monitor.

## 2022-01-20 PROCEDURE — 65220000003 HC RM SEMIPRIVATE PSYCH

## 2022-01-20 PROCEDURE — 74011250637 HC RX REV CODE- 250/637: Performed by: PSYCHIATRY & NEUROLOGY

## 2022-01-20 RX ORDER — OLANZAPINE 5 MG/1
5 TABLET ORAL EVERY EVENING
Status: DISCONTINUED | OUTPATIENT
Start: 2022-01-21 | End: 2022-01-24 | Stop reason: HOSPADM

## 2022-01-20 RX ADMIN — OLANZAPINE 5 MG: 5 TABLET, FILM COATED ORAL at 17:34

## 2022-01-20 RX ADMIN — OLANZAPINE 5 MG: 5 TABLET, FILM COATED ORAL at 09:10

## 2022-01-20 RX ADMIN — LITHIUM CARBONATE 300 MG: 300 TABLET ORAL at 17:34

## 2022-01-20 RX ADMIN — LITHIUM CARBONATE 300 MG: 300 TABLET ORAL at 09:10

## 2022-01-20 RX ADMIN — QUETIAPINE FUMARATE 200 MG: 100 TABLET ORAL at 21:11

## 2022-01-20 RX ADMIN — LITHIUM CARBONATE 300 MG: 300 TABLET ORAL at 21:11

## 2022-01-20 RX ADMIN — MULTIVITAMIN TABLET 1 TABLET: TABLET at 09:10

## 2022-01-20 NOTE — BH NOTES
DAYSHIFT NOTE:     Patient is up for his breakfast this morning and comes to the nurses station upon request for his medications. Patient is pleasant on approach and states, \"I am feeling the same and I have been better for many days. \" Patient initially did not want to take his zyprexa and stated that he had [facility] [place] [facility] the doctor and Andres Francois has never gone through this many problems with any other hospital or doctor. \" Patient denies having any depression and or anxiety. Denies SI/HI. Denies AH/VH. Denies having any feelings of paranoia. Patient ended up deciding to take all of his medications this morning, including the zyprexa, and he stated, \"just to prove that I can. \" Patient was educated that medications do not work that way and needed to be taken on a consistent basis for them to work properly. Patient was pretty adamant about only taking a low dose of lithium when he was discharged and \"not wanting to be on anything extra. \" Patient discussed how \"he doesn't like to be on a lot of different medication because he has control over his body\" and discussed how \"he can control his heart rate and make it high or low and also controls his hiccups and can make it to where he does not hiccup. \" Patient is up and sitting in the dayroom watching television. Patient interacts well amongst his peers. Patient is up for his meals. Attends groups. Close observations continued to ensure patient safety.

## 2022-01-20 NOTE — BH NOTES
Behavioral Health Treatment Team Note     Patient goal(s) for today: To discuss discharge plans  Treatment team focus/goals: Continue group therapy, medication management, and discharge planning. Facilitate a family session. Progress note: The pt was presenting with a broad affect and congruent mood. He would smile and laugh appropriately at times throughout the conversation. He denied SI/HI and AH/VH's. Prior to meeting with the pt this writer spoke with his ex-wife Lorena More on the phone. She said that the pt had called her last night and sounded better. She said that they had a good conversation. She was willing to have a family session with him. During the family session the pt was able to recall the events that led him to the hospital. He also acknowledged that while he felt like he was acting in a rational way, he understood how other's around him could perceive his behaviors as \"odd\" or \"erratic\". He also mentioned again about how he had forgotten his lithium at home before he travelled to [facility]. He also displayed good insight when he said that he probably should not have waited as long to get help after realizing that he did not have his medications with him here. This writer also asked him about his olanzapine that the doctor has ordered for him here, acknowledging that it looked like he took it this morning, from what was documented in the system. He said that he took it orally instead of an injection and is willing to take it until he discharges. He also asked if he would be getting a prescription of it when he is discharged. He asked appropriate questions about the medication which this writer answered for him. This writer also asked about how he felt flying in a couple days if he were to be discharged. He said that he doesn't feel like there would be any problems with that. He said that he likes to fly, and that it doesn't cause him any anxiety or distress.  His ex-wife had offered positive feedback for the pt at this time, and said that she had no other concerns that she would like to address. She added that he is sounding like himself now. An inpatient level of care is necessary in order to stabilize the pt further on medications and coordinate discharge plans with  and his psychiatrist in [place]. LOS:  15  Expected LOS: Committed up to 1/24/22    Insurance info/prescription coverage:  VA Medicare part A and B  Date of last family contact:  Spoke with his ex-wife today   Family requesting physician contact today:  no  Discharge plan:   To coordinate discharge plans with his [facility] providers after the pt is further stabilized on his medications   Guns in the home:  no   Outpatient provider(s):  Luis Harman, and      Participating treatment team members: Marta Doan, * (assigned SW), Kimmy Hicks LMSW

## 2022-01-20 NOTE — BH NOTES
Patient alert and verbal. Denies SI/HI. Denies AVH. Denies anxiety or depression. Voiced frustration towards psychiatrist because he has not yet been discharged. States he will appeal his commitment if he is not discharged before the facility's next hearing date. Patient educated on right to appeal. States he already made his  aware that he wanted to do this. Medication compliant. Visible on unit. Observed interacting well with peers. Able to make needs known. Adequately attends to hygiene. Q 15 minute checks continue for safety.

## 2022-01-20 NOTE — GROUP NOTE
IP  GROUP DOCUMENTATION INDIVIDUAL                                                                          Group Therapy Note    Date: 1/20/2022    Group Start Time: 0935  Group End Time: 1020  Group Topic: Education Group - Inpatient    SRM 2  NON ACUTE    Eitan Guillenn    IP 1150 [facility] GROUP DOCUMENTATION GROUP    Group Therapy Note    Facilitated discussion focused on the definition and symptoms of anxiety and positive ways to manage symptoms    Attendees: 9/14         Attendance: Attended    Patient's Goal:  Attend group daily     Interventions/techniques: Informed and Supported    Follows Directions:  Followed directions    Interactions: Interacted appropriately    Mental Status: Calm    Behavior/appearance: Cooperative    Goals Achieved: Able to engage in interactions, Able to listen to others, Able to self-disclose and Discussed coping      Additional Notes:  Receptive to information discussed and was able to recognize different symptoms and shared a positive way to cope     Antonette Escudero, [address]

## 2022-01-20 NOTE — GROUP NOTE
IP  GROUP DOCUMENTATION INDIVIDUAL                                                                          Group Therapy Note    Date: 1/20/2022    Group Start Time: 2131  Group End Time: 0096  Group Topic: Recreational/Music Therapy    SRM 2  NON ACUTE    Joy Dickinson    IP 1150 [facility] GROUP DOCUMENTATION GROUP    Group Therapy Note    Facilitated leisure skills group to reinforce positive coping through music, social interaction, group activities and arts/crafts    Attendees: 10/14         Attendance: Attended    Patient's Goal:  Attend group daily     Interventions/techniques: Art integration and Supported    Follows Directions: Followed directions    Interactions: Interacted appropriately    Mental Status: Calm    Behavior/appearance: Cooperative    Goals Achieved: Able to engage in interactions and Able to listen to others      Additional Notes:  Receptive to listening to music and a song he selected. Decline to work on leisure task.  Interacted with peers and staff    Gabby Herbert

## 2022-01-20 NOTE — PROGRESS NOTES
Problem: Depressed Mood (Adult/Pediatric)  Goal: *STG: Attends activities and groups  Outcome: Progressing Towards Goal  Goal: *STG: Complies with medication therapy  Outcome: Progressing Towards Goal     Problem: Anxiety  Goal: *Alleviation of anxiety  Outcome: Progressing Towards Goal     Problem: Psychosis  Goal: *STG: Remains safe in hospital  Outcome: Progressing Towards Goal

## 2022-01-20 NOTE — GROUP NOTE
IP  GROUP DOCUMENTATION INDIVIDUAL                                                                          Group Therapy Note    Date: 1/19/2022    Group Start Time: 1830  Group End Time: 1920  Group Topic: Recreational/Music Therapy    SRM 2 BH NON ACUTE    Erlene Florencio    IP 1150 [facility] GROUP DOCUMENTATION GROUP    Group Therapy Note    Attendees: 9/13  Facilitated structured group to introduce healthy leisure task skill as positive way to cope and manage stress. Attendance: Attended    Patient's Goal:  STG: Attends activities and groups        Interventions/techniques: Art integration and Supported    Follows Directions: Followed directions    Interactions: Interacted appropriately    Mental Status: Calm and Flat    Behavior/appearance: Attentive    Goals Achieved: Able to engage in interactions and Able to listen to others    Additional Notes: Attended group and actively participated. Offered leisure packet. Declined packet but listened to music with peers. Able to select songs in group. Was receptive to intervention and used time constructively.     Jace Clifford, CTRS

## 2022-01-20 NOTE — GROUP NOTE
MARNIE  GROUP DOCUMENTATION INDIVIDUAL                                                                          Group Therapy Note    Date: 1/20/2022    Group Start Time: 1115  Group End Time: 1200  Group Topic: Process Group - Inpatient    SRM 2 BEHA TH ACUTE    Myra Yao    IP [address] [facility] GROUP DOCUMENTATION GROUP    Group Therapy Note    Attendees: 9/14    Writer facilitated a processing group. Pts were asked to check in regarding their feelings and coping skills. Writer facilitated a discussion regarding healthy boundaries and [employer]. \" Purpose of the discussion was to help pts gain insight into healthy ways to manage disagreements with partners, friends, family members, etc. Writer concluded session with a grounding exercise and encouraged pts to provide feedback regarding the group. Attendance: Attended    Patient's Goal:  To attend and participate in daily groups and activities. Interventions/techniques: Informed, Validated, Promoted peer support, Provide feedback and Supported    Follows Directions: Followed directions    Interactions: Interacted appropriately    Mental Status: Calm, Congruent and Delusions    Behavior/appearance: Attentive, Cooperative and Motivated    Goals Achieved: Able to engage in interactions, Able to listen to others, Able to give feedback to another, Able to reflect/comment on own behavior, Able to manage/cope with feelings, Able to self-disclose and Discussed coping      Additional Notes:  Pt was able to actively engage. Pt presented with some delusional thinking at the beginning of the group but was able to offer positive feedback and actively participated in providing examples of fair fighting rules. Pt is continuing to make progress by attending and participating in groups daily.      Natalie Suazo

## 2022-01-21 LAB — LITHIUM SERPL-SCNC: 0.5 MMOL/L (ref 0.6–1.2)

## 2022-01-21 PROCEDURE — 74011250637 HC RX REV CODE- 250/637: Performed by: PSYCHIATRY & NEUROLOGY

## 2022-01-21 PROCEDURE — 65220000003 HC RM SEMIPRIVATE PSYCH

## 2022-01-21 PROCEDURE — 80178 ASSAY OF LITHIUM: CPT

## 2022-01-21 PROCEDURE — 36415 COLL VENOUS BLD VENIPUNCTURE: CPT

## 2022-01-21 RX ADMIN — OLANZAPINE 5 MG: 5 TABLET, FILM COATED ORAL at 17:22

## 2022-01-21 RX ADMIN — LITHIUM CARBONATE 300 MG: 300 TABLET ORAL at 21:04

## 2022-01-21 RX ADMIN — MULTIVITAMIN TABLET 1 TABLET: TABLET at 09:45

## 2022-01-21 RX ADMIN — LITHIUM CARBONATE 300 MG: 300 TABLET ORAL at 09:45

## 2022-01-21 RX ADMIN — QUETIAPINE FUMARATE 200 MG: 100 TABLET ORAL at 21:04

## 2022-01-21 RX ADMIN — LITHIUM CARBONATE 300 MG: 300 TABLET ORAL at 17:22

## 2022-01-21 NOTE — BH NOTES
Behavioral Health Treatment Team Note     Patient goal(s) for today: To discuss discharge plans  Treatment team focus/goals: continue group therapy and medication management     Progress note: The pt was presenting with a broad affect and congruent mood. He expressed feeling good, and denied SI/HI and AH/VH's. When this writer asked him about medications, he said that he has been taking all of them, including the olanzapine, which he feels like is working well. He again voiced his frustrations about his experience here, especially with the committment process. He said that he does not feel like it is a fair process, since his  did not take the time to get to know him at all or speak with him prior to this hearing. This writer validated his feelings, and explained that they go mainly off of the documentation that was done at the time of someone's admission. The pt also asked appropriate questions about his discharge, and his belongings that were left at [facility]. This writer explained that the [place] has shipped most of his things back to [place], although there are still some valuables at [facility] which  will be bringing with her when she picks him up on Monday. He was happy to hear this. An inpatient level of care is necessary in order to coordinate discharge plans with the [place] for Monday. LOS:  16  Expected LOS: 16-18 days     Insurance info/prescription coverage:  VA Medicare part A and B  Date of last family contact:  Spoke with ex-wife 1/20  Family requesting physician contact today:  no  Discharge plan:   To discharge to 's possession on Monday   Guns in the home:  no   Outpatient provider(s):  Suresh Sam, and     Participating treatment team members: Cary Cottrell, * (assigned SW), Mary Kate Garcia LMSW

## 2022-01-21 NOTE — PROGRESS NOTES
Psychiatric Progress Note    Patient: Yakelin Ortega MRN: [id number]  SSN: [id number]    YOB: 1960  Age: 64 y.o. Sex: male      Admit Date: 1/4/2022       Subjective: Yakelin Ortega patient reports to be nondistressed and worried about COVID today. He minimally participates in the interview more so than yesterday. No suicidal or homicidal ideations and will continue to follow closely. No aggression or violence. Tolerating medications well. Eating well and sleeping well. Case reviewed with nursing staff and no significant issues or events reported in the last 24 hours. Staff has observed patient interacting on the unit and minimally attending group.     Objective:   Vital signs reviewed and stable     Mental Status Exam:   Sensorium  Oriented to time, place, and situation   Relations  poorly engages in interview    Eye Contact Appropriate   Appearance:  Appropriate for venue and season   Speech:  Normal rate, tone, volume and pattern   Thought Process:  Intact   Thought Content  endorses delusions, no clear hallucinations   Suicidal ideations No active and contracts for safety   Mood:  Depressed and anxious   Affect:  Flat, and guarded   Memory    Intact   Concentration:  Intact   Insight:   poor   Judgment:  poor     No signs of tardive dyskinesia or extrapyramidal symptoms    MEDICATIONS:  Current Facility-Administered Medications   Medication Dose Route Frequency    OLANZapine (ZyPREXA) tablet 5 mg  5 mg Oral QPM    ziprasidone (GEODON) capsule 40 mg  40 mg Oral Q12H PRN    ziprasidone (GEODON) 20 mg in sterile water (preservative free) 1 mL injection  20 mg IntraMUSCular Q12H PRN    QUEtiapine (SEROquel) tablet 200 mg  200 mg Oral QHS    traZODone (DESYREL) tablet 100 mg  100 mg Oral QHS PRN    multivitamin with folic acid (ONE DAILY WITH FOLIC ACID) tablet 1 Tablet  1 Tablet Oral DAILY    lithium carbonate tablet 300 mg  300 mg Oral TID    acetaminophen (TYLENOL) tablet 650 mg  650 mg Oral Q4H PRN    magnesium hydroxide (MILK OF MAGNESIA) 400 mg/5 mL oral suspension 30 mL  30 mL Oral DAILY PRN        DISCUSSION:  Discussed risk and benefits of medication, provided an opportunity to answer any questions, reviewed discharge goals. Lab/Data Review:  Recent Results (from the past 24 hour(s))   LITHIUM    Collection Time: 01/21/22  8:23 AM   Result Value Ref Range    Lithium level 0.50 (L) 0.60 - 1.20 mmol/L           Assessment:     Principal Problem:    Psychosis ([facility] (1/5/2022)    Active Problems:    Vesta ([facility] (1/5/2022)        Plan:     Continues on Zyprexa    Continue to participate in the milieu of activities and work towards discharge goals. Contracts for safety and has no immediate requests    Disposition planning with social work with the goal of a transition to an outpatient level of care. Patient would benefit from ongoing care as they have not yet reached their discharge goals are psychotropic medication optimization.     Tentative discharge TBD     Signed By: Devan Mishra, PhD, PA-C, PsyCAQ    January 21, 2022

## 2022-01-21 NOTE — GROUP NOTE
IP  GROUP DOCUMENTATION INDIVIDUAL                                                                          Group Therapy Note    Date: 1/21/2022    Group Start Time: 0935  Group End Time: 1020  Group Topic: Education Group - Inpatient    SRM 2 BH NON ACUTE    [place] Span    IP [address] [facility] GROUP DOCUMENTATION GROUP    Group Therapy Note    Facilitated group to introduce the definition and benefits of diaphragmatic breathing and demonstration of relaxation technique    Attendees: 10/14         Attendance: Attended    Patient's Goal:  Attend group daily     Interventions/techniques: Informed, Supported and Relaxation    Follows Directions: Followed directions    Interactions: Interacted appropriately    Mental Status: Calm    Behavior/appearance: Cooperative    Goals Achieved: Able to engage in interactions, Able to listen to others and Discussed coping      Additional Notes:  Receptive to information discussed and was able to demonstrate ability to practice diaphragmatic breathing technique. Was able to sit quietly and focus on guided imagery. Verbalized feeling relaxed.  Pt shared talking to someone and watching tv help him to relax    [place], [address]

## 2022-01-21 NOTE — PROGRESS NOTES
Progress Note  Date:2022       Room:[facility]  Patient Name:Avni Flores     YOB: 1960     Age:61 y.o. Subjective    Subjective patient continues to be reluctant with medication. He currently has agreed to take his medication. He is taking his medications work a lot of reluctance. His compliance with medications on the outside is questionable. Mental status examination-patient is alert and oriented x3. Slightly tired looking. Denies any active suicidal or homicidal ideations often noted more appropriate interactions with peers. Not made any paranoid or delusional statements. Review of Systems  Objective         Vitals Last 24 Hours:  TEMPERATURE:  Temp  Av.1 °F (37.3 °C)  Min: 97.5 °F (36.4 °C)  Max: 100.7 °F (38.2 °C)  RESPIRATIONS RANGE: Resp  Av  Min: 16  Max: 18  PULSE OXIMETRY RANGE: No data recorded  PULSE RANGE: Pulse  Av.5  Min: 84  Max: 97  BLOOD PRESSURE RANGE: Systolic (46HOS), YPM:340 , Min:102 , TYB:203   ; Diastolic (00NYF), GWR:25, Min:60, Max:86    I/O (24Hr): No intake or output data in the 24 hours ending 22  Objective  Labs/Imaging/Diagnostics    Labs:  CBC:No results for input(s): WBC, RBC, HGB, HCT, MCV, RDW, PLT, HGBEXT, HCTEXT, PLTEXT in the last 72 hours. CHEMISTRIES:No results for input(s): NA, K, CL, CO2, BUN, CA, PHOS, MG in the last 72 hours. No lab exists for component: CREATININE, GLUCOSEPT/INR:No results for input(s): INR, INREXT in the last 72 hours. No lab exists for component: PROTIME  APTT:No results for input(s): APTT in the last 72 hours. LIVER PROFILE:No results for input(s): AST, ALT in the last 72 hours. No lab exists for component: BILIDIR, BILITOT, ALKPHOS  No results found for: ALT, AST, GGT, GGTP, AP, APIT, APX, CBIL, TBIL, TBILI    Imaging Last 24 Hours:  No results found.   Assessment//Plan   Principal Problem:    Psychosis ([facility] (2022)    Active Problems:    [facility]) (1/5/2022)      Assessment & Plan   Lithium level scheduled for tomorrow at 7 AM  Family meeting was went well he has been taking his medications with a lot of reluctance. His compliance with medications is questionable on the outside.   Patient discussed in the treatment team.  Will follow-up with his outpatient providers to arrange for disposition planning so he can be stepped for outpatient care      Current Facility-Administered Medications:     [START ON 1/21/2022] OLANZapine (ZyPREXA) tablet 5 mg, 5 mg, Oral, QPM, Jesenia Leal MD    ziprasidone (GEODON) capsule 40 mg, 40 mg, Oral, Q12H PRN, Jesenia Leal MD, 40 mg at 01/13/22 3509    ziprasidone (GEODON) 20 mg in sterile water (preservative free) 1 mL injection, 20 mg, IntraMUSCular, Q12H PRN, Jesenia Leal MD, 20 mg at 01/12/22 1108    QUEtiapine (SEROquel) tablet 200 mg, 200 mg, Oral, QHS, Jesenia Leal MD, 200 mg at 01/20/22 2111    traZODone (DESYREL) tablet 100 mg, 100 mg, Oral, QHS PRN, Savita Bui MD, 100 mg at 01/12/22 2111    multivitamin with folic acid (ONE DAILY WITH FOLIC ACID) tablet 1 Tablet, 1 Tablet, Oral, DAILY, Jesenia Leal MD, 1 Tablet at 01/20/22 0910    lithium carbonate tablet 300 mg, 300 mg, Oral, TID, Jesenia Leal MD, 300 mg at 01/20/22 2111    acetaminophen (TYLENOL) tablet 650 mg, 650 mg, Oral, Q4H PRN, Lolly Fleischer, MD, 650 mg at 01/18/22 2111    magnesium hydroxide (MILK OF MAGNESIA) 400 mg/5 mL oral suspension 30 mL, 30 mL, Oral, DAILY PRN, Lolly Fleischer, MD  Electronically signed by Levy Ochoa MD on 1/20/2022 at 9:17 PM

## 2022-01-21 NOTE — GROUP NOTE
MARNIE  GROUP DOCUMENTATION INDIVIDUAL                                                                          Group Therapy Note    Date: 1/21/2022    Group Start Time: 1115  Group End Time: 1200  Group Topic: Process Group - Inpatient    SRM CARE MANAGEMENT    Elke Torres BSW    [facility] GROUP DOCUMENTATION GROUP    Group Therapy Note    The writer talked with the group on positive self talk and coping skills and challenged them to access what they do well and what they need to work on. Attendees: 10/14          Attendance: Attended    Patient's Goal:  Attend Group     Interventions/techniques: Supported    Follows Directions: Unable to follow directions    Interactions: Disorganized interaction    Mental Status: Delusions, Happy and Preoccupied    Behavior/appearance: Cooperative, Disheveled, Enthusiastic, Needed prompting and Poor eye contact    Goals Achieved: Able to listen to others, Able to receive feedback and Able to self-disclose      Additional Notes: The patient's thoughts were hard to follow and he fell asleep during group. He did share that he can't always trust his thoughts and to always give them a second thought when he has them.      BROOKE Saravia

## 2022-01-21 NOTE — GROUP NOTE
[facility] GROUP DOCUMENTATION INDIVIDUAL                                                                          Group Therapy Note    Date: 1/21/2022    Group Start Time: 7779  Group End Time: 0861  Group Topic: Recreational/Music Therapy    SRM 2  NON ACUTE    Song [place]    IP [address]150 [facility] GROUP DOCUMENTATION GROUP    Group Therapy Note    Facilitated leisure skills group to reinforce positive coping through music, social interaction, group activities and arts/crafts    Attendees: 6/14         Attendance: Attended    Patient's Goal:  Attend group daily     Interventions/techniques: Art integration and Supported    Follows Directions: Followed directions    Interactions: Interacted appropriately    Mental Status: Calm    Behavior/appearance: Cooperative    Goals Achieved: Able to engage in interactions and Able to listen to others      Additional Notes:   Receptive to listening to music and songs he selected. Decline to work on leisure task.  Interacted with peers and staff     Leonard Braden

## 2022-01-21 NOTE — PROGRESS NOTES
Progress Note  Date:2022       Room:[facility]  Patient Name:Avni Flores     YOB: 1960     Age:61 y.o. Subjective    Subjective   Patient has been compliant with his medication. He has noted more sleepy during the groups for the last 2 days. No active delusions noted. No active SI or HI noted    Mental status examination-appropriate in conversation cooperative pleasant. Review of Systems  Objective         Vitals Last 24 Hours:  TEMPERATURE:  Temp  Av.3 °F (37.4 °C)  Min: 97.9 °F (36.6 °C)  Max: 100.7 °F (38.2 °C)  RESPIRATIONS RANGE: Resp  Av  Min: 18  Max: 18  PULSE OXIMETRY RANGE: No data recorded  PULSE RANGE: Pulse  Av  Min: 87  Max: 97  BLOOD PRESSURE RANGE: Systolic (96LCF), UCK:820 , Min:133 , GFR:899   ; Diastolic (18CPQ), FRD:17, Min:86, Max:90    I/O (24Hr): No intake or output data in the 24 hours ending 22  Objective  Labs/Imaging/Diagnostics    Labs:  CBC:No results for input(s): WBC, RBC, HGB, HCT, MCV, RDW, PLT, HGBEXT, HCTEXT, PLTEXT in the last 72 hours. CHEMISTRIES:No results for input(s): NA, K, CL, CO2, BUN, CA, PHOS, MG in the last 72 hours. No lab exists for component: CREATININE, GLUCOSEPT/INR:No results for input(s): INR, INREXT in the last 72 hours. No lab exists for component: PROTIME  APTT:No results for input(s): APTT in the last 72 hours. LIVER PROFILE:No results for input(s): AST, ALT in the last 72 hours. No lab exists for component: BILIDIR, BILITOT, ALKPHOS  No results found for: ALT, AST, GGT, GGTP, AP, APIT, APX, CBIL, TBIL, TBILI    Imaging Last 24 Hours:  No results found. Assessment//Plan   Principal Problem:    [facility] (2022)    Active Problems:    Vesta ([facility] (2022)      Assessment & Plan  Decrease olanzapine to 5 mg at bedtime and discontinue morning olanzapine. Lithium level is 0.  5 0.   Patient had refused medications few times which led to his drop in his lithium level. Currently he has been consistent with this medication. Patient engaged in group and continuing to feel ready for his discharge. Discharge plans in place for Monday.       Current Facility-Administered Medications:     OLANZapine (ZyPREXA) tablet 5 mg, 5 mg, Oral, QPM, Jesenia Leal MD, 5 mg at 01/21/22 1722    ziprasidone (GEODON) capsule 40 mg, 40 mg, Oral, Q12H PRN, Jules Fuentes MD, 40 mg at 01/13/22 1734    ziprasidone (GEODON) 20 mg in sterile water (preservative free) 1 mL injection, 20 mg, IntraMUSCular, Q12H PRN, Jesenia Leal MD, 20 mg at 01/12/22 1108    QUEtiapine (SEROquel) tablet 200 mg, 200 mg, Oral, QHS, Jesenia Leal MD, 200 mg at 01/20/22 2111    traZODone (DESYREL) tablet 100 mg, 100 mg, Oral, QHS PRN, Jules Fuentes MD, 100 mg at 01/12/22 2111    multivitamin with folic acid (ONE DAILY WITH FOLIC ACID) tablet 1 Tablet, 1 Tablet, Oral, DAILY, Jesenia Leal MD, 1 Tablet at 01/21/22 0945    lithium carbonate tablet 300 mg, 300 mg, Oral, TID, Jesenia Leal MD, 300 mg at 01/21/22 1722    acetaminophen (TYLENOL) tablet 650 mg, 650 mg, Oral, Q4H PRN, Ja Franco MD, 650 mg at 01/18/22 2111    magnesium hydroxide (MILK OF MAGNESIA) 400 mg/5 mL oral suspension 30 mL, 30 mL, Oral, DAILY PRN, Ja Franco MD  Electronically signed by Jim Castro MD on 1/21/2022 at [address] PM

## 2022-01-21 NOTE — PROGRESS NOTES
Problem: Depressed Mood (Adult/Pediatric)  Goal: *STG: Participates in treatment plan  Outcome: Progressing Towards Goal  Goal: *STG: Remains safe in hospital  Outcome: Progressing Towards Goal  Goal: *STG: Complies with medication therapy  Outcome: Progressing Towards Goal     Problem: Psychosis  Goal: *STG: Decreased hallucinations  Outcome: Progressing Towards Goal  Goal: *STG: Decreased delusional thinking  Outcome: Progressing Towards Goal  Pt accepted and tolerated meds as ordered. Pt stated he is unsure of his d/c plans and has spoken with Dr Julita Paz whom will set up his outpatient treatment. Pt stated he has not discussed any plans with the hospital CM. Pt without noted or reported delusions and hallucinations. Pt encouraged to discuss with his CM on tomorrow what he has reported to writer.

## 2022-01-22 PROCEDURE — 65220000003 HC RM SEMIPRIVATE PSYCH

## 2022-01-22 PROCEDURE — 74011250637 HC RX REV CODE- 250/637: Performed by: PSYCHIATRY & NEUROLOGY

## 2022-01-22 RX ADMIN — LITHIUM CARBONATE 300 MG: 300 TABLET ORAL at 21:31

## 2022-01-22 RX ADMIN — OLANZAPINE 5 MG: 5 TABLET, FILM COATED ORAL at 17:08

## 2022-01-22 RX ADMIN — MULTIVITAMIN TABLET 1 TABLET: TABLET at 08:49

## 2022-01-22 RX ADMIN — QUETIAPINE FUMARATE 200 MG: 100 TABLET ORAL at 21:30

## 2022-01-22 RX ADMIN — LITHIUM CARBONATE 300 MG: 300 TABLET ORAL at 16:06

## 2022-01-22 RX ADMIN — LITHIUM CARBONATE 300 MG: 300 TABLET ORAL at 08:49

## 2022-01-22 NOTE — GROUP NOTE
IP  GROUP DOCUMENTATION INDIVIDUAL                                                                          Group Therapy Note    Date: 1/22/2022    Group Start Time: 2212  Group End Time: 1400  Group Topic: Recreational/Music Therapy    SRM 2 BH NON ACUTE    Zarate Damion    IP [facility] GROUP DOCUMENTATION GROUP    Group Therapy Note    Attendees: 5/11  Facilitated structured group to introduce healthy leisure task skill as positive way to cope and manage stress. Attendance: Attended    Patient's Goal: STG: Attends activities and groups    Interventions/techniques: Art integration and Supported    Follows Directions: Followed directions    Interactions: Interacted appropriately    Mental Status: Calm    Behavior/appearance: Attentive    Goals Achieved: Able to engage in interactions and Able to listen to others      Additional Notes: Attended group and actively participated. Offered leisure packet. Declined packet but listened to music with peers. Able to select songs in group. Was receptive to intervention and used time constructively.     Jase Kraft

## 2022-01-22 NOTE — PROGRESS NOTES
Problem: Depressed Mood (Adult/Pediatric)  Goal: *STG: Verbalizes anger, guilt, and other feelings in a constructive manor  Outcome: Progressing Towards Goal  Goal: *STG: Remains safe in hospital  Outcome: Progressing Towards Goal     Problem: Psychosis  Goal: *STG: Decreased hallucinations  Outcome: Progressing Towards Goal  Goal: *STG/LTG: Complies with medication therapy  Outcome: Progressing Towards Goal  Goal: *STG/LTG: Demonstrates improved thought patterns as evidenced by logical and coherent speech  Outcome: Progressing Towards Goal

## 2022-01-22 NOTE — GROUP NOTE
[facility] GROUP DOCUMENTATION INDIVIDUAL                                                                          Group Therapy Note    Date: 1/22/2022    Group Start Time: 0930  Group End Time: 6230  Group Topic: Education Group - Inpatient    SRM 2  NON ACUTE    Erlinda Marin    [facility] GROUP DOCUMENTATION GROUP    Group Therapy Note    Attendees:4/11  Facilitated structured group to identify emotions, and positive ways to cope and manage emotions       Attendance: Attended    Patient's Goal: STG: Attends activities and groups      Interventions/techniques: Informed, Provide feedback and Supported    Follows Directions: Followed directions    Interactions: Interacted appropriately    Mental Status: Calm and Flat    Behavior/appearance: Attentive and Cooperative    Goals Achieved: Able to engage in interactions and Able to listen to others    Additional Notes: Attended group. Received material provided and was able to identify recent emotions experienced and verbalized what feelings/thoughts occurred based on those emotions. PT also related strategies for managing emotions. PT was receptive to intervention and feedback from staff. PT received prompts and cues from staff without difficulty and engaged / attended entire session. PT stated he was feeling bored due to hospitalization and also prior to admission being upset with co-workers and others that he reached out for help to based on his self perceived lack of support. Related he was feeling hopeful for an anticipated discharge soon.      Mando Palumbo, CTRS

## 2022-01-22 NOTE — BH NOTES
Patient alert and verbal. Denies SI/HI. Denies AVH. Denies anxiety and depression. Mood is euthymic. Affect full range. Expressed excitement regarding upcoming discharge in a few days. Medication compliant. Visible on unit. Interacts well with staff and peers. Q 15 minute checks continue for safety.

## 2022-01-22 NOTE — BH NOTES
Alert and oriented x 4. Affect and mood are congruent. Denies SI/HI at this time. No AVH noted or reported. Medication and meal compliant. Attending groups and interacting well with peers. Visible on the unit. No bx issues noted.

## 2022-01-22 NOTE — BH NOTES
Behavioral Health Treatment Team Note     Patient goal(s) for today: \"discharge by Monday\"   Treatment team focus/goals:  continue group therapy and medication management     Progress note: The patient presented in bed and appeared to be neatly groomed. He denied SI/HI/AVH at this time and was oriented x3, the writer and patient talked about group and the patient asked if he would be leaving on Monday. The writer told him that she is unsure of what he and his  have been planning together, but if it is agreed on that he should discharge that day then it would happen. The patient is looking forward to many things when he gets out and has been keeping his goals displayed on his wall. An inpatient level of care is necessary in order to coordinate discharge plans with the [place] for Monday. LOS:  17  Expected LOS: 18-21 Days     Insurance info/prescription coverage:  VA Medicare part A and B  Date of last family contact:  Spoke with ex-wife 1/20  Family requesting physician contact today:  no  Discharge plan:   To discharge to 's possession on Monday   Guns in the home:  no   Outpatient provider(s):  Caryn Woodruff and     Participating treatment team members: Earle Porter,   [facility], [address] [place] [address]

## 2022-01-23 PROCEDURE — 74011250637 HC RX REV CODE- 250/637: Performed by: PSYCHIATRY & NEUROLOGY

## 2022-01-23 PROCEDURE — 65220000003 HC RM SEMIPRIVATE PSYCH

## 2022-01-23 RX ADMIN — OLANZAPINE 5 MG: 5 TABLET, FILM COATED ORAL at 17:04

## 2022-01-23 RX ADMIN — LITHIUM CARBONATE 300 MG: 300 TABLET ORAL at 16:33

## 2022-01-23 RX ADMIN — LITHIUM CARBONATE 300 MG: 300 TABLET ORAL at 08:16

## 2022-01-23 RX ADMIN — MULTIVITAMIN TABLET 1 TABLET: TABLET at 08:16

## 2022-01-23 RX ADMIN — LITHIUM CARBONATE 300 MG: 300 TABLET ORAL at 21:42

## 2022-01-23 RX ADMIN — QUETIAPINE FUMARATE 200 MG: 100 TABLET ORAL at 21:42

## 2022-01-23 NOTE — PROGRESS NOTES
Problem: Depressed Mood (Adult/Pediatric)  Goal: *STG: Verbalizes anger, guilt, and other feelings in a constructive manor  Outcome: Progressing Towards Goal     Problem: Depressed Mood (Adult/Pediatric)  Goal: *STG: Attends activities and groups  Outcome: Progressing Towards Goal     Problem: Depressed Mood (Adult/Pediatric)  Goal: *STG: Demonstrates reduction in symptoms and increase in insight into coping skills/future focused  Outcome: Progressing Towards Goal     Problem: Depressed Mood (Adult/Pediatric)  Goal: *STG: Complies with medication therapy  Outcome: Progressing Towards Goal

## 2022-01-23 NOTE — PROGRESS NOTES
Progress Note  Date:2022       Room:[facility]  Patient Name:Avni Flores     YOB: 1960     Age:61 y.o. Subjective    Subjective   Review of Systems  Objective         Vitals Last 24 Hours:  TEMPERATURE:  Temp  Av.6 °F (37 °C)  Min: 98.3 °F (36.8 °C)  Max: 99.5 °F (37.5 °C)  RESPIRATIONS RANGE: Resp  Av  Min: 18  Max: 18  PULSE OXIMETRY RANGE: SpO2  Av.7 %  Min: 95 %  Max: 100 %  PULSE RANGE: Pulse  Av  Min: 90  Max: 90  BLOOD PRESSURE RANGE: Systolic (92OMC), HVY:882 , Min:121 , TGR:267   ; Diastolic (77WOK), NXS:42, Min:70, Max:75    I/O (24Hr): No intake or output data in the 24 hours ending 22 5333  Objective  Labs/Imaging/Diagnostics    Labs:  CBC:No results for input(s): WBC, RBC, HGB, HCT, MCV, RDW, PLT, HGBEXT, HCTEXT, PLTEXT in the last 72 hours. CHEMISTRIES:No results for input(s): NA, K, CL, CO2, BUN, CA, PHOS, MG in the last 72 hours. No lab exists for component: CREATININE, GLUCOSEPT/INR:No results for input(s): INR, INREXT in the last 72 hours. No lab exists for component: PROTIME  APTT:No results for input(s): APTT in the last 72 hours. LIVER PROFILE:No results for input(s): AST, ALT in the last 72 hours. No lab exists for component: BILIDIR, BILITOT, ALKPHOS  No results found for: ALT, AST, GGT, GGTP, AP, APIT, APX, CBIL, TBIL, TBILI    Imaging Last 24 Hours:  No results found.   Assessment//Plan   Principal Problem:    Psychosis ([facility] (2022)    Active Problems:    Vesta ([facility] (2022)      Assessment & Plan patient case seen for follow-up patient of Dr. Michael Cooper patient is an alert verbal polite walks in the hallway interacting with the peers not suicidal or homicidal he says he was supposed to be leaving on Monday some psychologist from [address] [place] [address] Luis Manuel supposed to pick him up to the airport he says he worked with the homeland security I am not sure if there is a delusional he was hoping that she should be able to go home on Monday continued inpatient level of care indicated no side effects we will reassess the case tomorrow    Electronically signed by Caridad Prince MD on 1/22/2022 at 11:43 PM

## 2022-01-23 NOTE — BH NOTES
Alert and oriented x 4. Affect and mood are improved. Pt noted to be smiling and pleasant. Denies SI/HI at this time. Medication and meal compliant. Pt verbalizes readiness for planned d/c this week. Attending groups and interacting appropriately with peers and staff. No bx issues noted.  Continue to monitor,

## 2022-01-23 NOTE — GROUP NOTE
MARNIE  GROUP DOCUMENTATION INDIVIDUAL                                                                          Group Therapy Note    Date: 1/23/2022    Group Start Time: 0930  Group End Time: 3273  Group Topic: Education Group - Inpatient    [facility] [address] [facility] GROUP DOCUMENTATION GROUP    Group Therapy Note    Attendees: 3/9    Facilitated structured group discussion about Automatic Negative Thoughts. Introduced common thoughts that are problematic in coping and cause negative emotions and identified positive ways to cope with those negative thoughts. Attendance: Attended    Patient's Goal:  STG: Attends activities and groups        Interventions/techniques: Informed and Promoted peer support    Follows Directions: Followed directions    Interactions: Interacted appropriately    Mental Status: Calm    Behavior/appearance: Attentive    Goals Achieved: Able to listen to others      Additional Notes: Attended group and participated in group discussion. Was receptive to intervention. Verbalized use of problematic thoughts in the past.Able to relate to examples given in group. Pt left group early .     Oli Brown, CTRS

## 2022-01-23 NOTE — BH NOTES
Behavioral Health Treatment Team Note     Patient goal(s) for today: \"update my board\"  Treatment team focus/goals: continue group therapy and medication management     Progress note: The patient presented in the day room and appeared neatly groomed, he denied SI/HI/AVH at this time and was oriented x4. He described his mood as 'okay' and presented a calm mood with a congruent affect. The patient talked about being hopeful of leaving tomorrow and spoke with the writer of the plans he has on his board for discharge.  An inpatient level of care is necessary in order to coordinate discharge plans with the [employer] for Monday.     LOS:  18  Expected LOS: 19-22 Days     Insurance info/prescription coverage:  [place] Medicare part A and B  Date of last family contact:  Spoke with ex-wife 1/20  Family requesting physician contact today:  no  Discharge plan:  To discharge to 's possession on [address] in the home:  no   Outpatient provider(s):  Dr.Danielle Caldwell, and     Participating treatment team members: Mira Burks,   Assigned Therapist Billy Hernandez, [address], [address] [facility]

## 2022-01-23 NOTE — BH NOTES
Pt. Pleasant and cooperative medication compliant, slept well. Pt. Denies anxiety, depression, SI/HI, hallucinations or pain. Pt. In no distress respirations regular and within normal limits will continue to round closely Q15 mins per unit protocol.

## 2022-01-23 NOTE — GROUP NOTE
IP  GROUP DOCUMENTATION INDIVIDUAL                                                                          Group Therapy Note    Date: 1/23/2022    Group Start Time: 8721  Group End Time: 1400  Group Topic: Recreational/Music Therapy    SR[facility] 2 BH NON ACUTE    Romana Bombard    IP [address] [facility] GROUP DOCUMENTATION GROUP    Group Therapy Note    Attendees: 6/9  Facilitated structured group to introduce healthy leisure task skill as positive way to cope and manage stress. Attendance: Attended    Patient's Goal:  STG: Attends activities and groups        Interventions/techniques: Art integration and Supported    Follows Directions: Followed directions    Interactions: Interacted appropriately    Mental Status: Calm and Flat    Behavior/appearance: Attentive    Goals Achieved: Able to listen to others      Additional Notes: Attended group and actively participated. Offered leisure packet. Declined packet but listened to music with peers. Able to select songs in group. Was receptive to intervention and used time constructively.     Harini Lassiter, CTRS

## 2022-01-24 VITALS
BODY MASS INDEX: 39.24 KG/M2 | WEIGHT: 250 LBS | OXYGEN SATURATION: 96 % | HEART RATE: 91 BPM | HEIGHT: 67 IN | DIASTOLIC BLOOD PRESSURE: 75 MMHG | SYSTOLIC BLOOD PRESSURE: 129 MMHG | TEMPERATURE: 97.4 F | RESPIRATION RATE: 18 BRPM

## 2022-01-24 PROCEDURE — 74011250637 HC RX REV CODE- 250/637: Performed by: PSYCHIATRY & NEUROLOGY

## 2022-01-24 RX ORDER — OLANZAPINE 5 MG/1
5 TABLET ORAL EVERY EVENING
Qty: 30 TABLET | Refills: 0 | Status: SHIPPED | OUTPATIENT
Start: 2022-01-24

## 2022-01-24 RX ORDER — LITHIUM CARBONATE 300 MG
300 TABLET ORAL 3 TIMES DAILY
Qty: 90 TABLET | Refills: 0 | Status: SHIPPED | OUTPATIENT
Start: 2022-01-24

## 2022-01-24 RX ORDER — QUETIAPINE FUMARATE 200 MG/1
200 TABLET, FILM COATED ORAL
Qty: 30 TABLET | Refills: 0 | Status: SHIPPED | OUTPATIENT
Start: 2022-01-24

## 2022-01-24 RX ADMIN — MULTIVITAMIN TABLET 1 TABLET: TABLET at 08:45

## 2022-01-24 RX ADMIN — LITHIUM CARBONATE 300 MG: 300 TABLET ORAL at 08:45

## 2022-01-24 NOTE — BH NOTES
Pt up in the milieu throughout the evening watching T.V.  Pt noted sitting by himself, has been quiet, not interacting with others, and has an affect that is WNLs. Pt has been pleasant and cooperative. He denies having any A/D/SI/HI or hallucinations. Pt is med compliant and no prn medication requested. He has accepted snacks and something to drink. No c/o pain or discomfort. /89 w/ HR 92. He remains A+O and ambulates independently. The pt has been resting in bed with his eyes closed. Pt noted snoring loudly at times. He has been up and sitting at the bedside x1. He is presently resting quietly in bed without any distress. Respirations are quiet and unlabored. He remains on close observation for safety.

## 2022-01-24 NOTE — GROUP NOTE
MARNIE  GROUP DOCUMENTATION INDIVIDUAL                                                                          Group Therapy Note    Date: 1/24/2022    Group Start Time: 1115  Group End Time: 1250  Group Topic: Process Group - Inpatient    [facility] CARE MANAGEMENT    Elke Torres BSW    [facility] GROUP DOCUMENTATION GROUP    Group Therapy Note    The writer gave the group a handout that challenged anxious thoughts. Attendees: 7/13          Attendance: Did not attend    Additional Notes: The patient was encouraged to come to group but did not attend.        JOSE NixonW

## 2022-01-24 NOTE — BH NOTES
Behavioral Health Transition Record to Provider    Patient Name: Minnie Saldaña  YOB: 1960  Medical Record Number: [id number]  Date of Admission: 1/4/2022  Date of Discharge: 1/24/22    Attending Provider: Mercedez Barragan MD  Discharging Provider:   To contact this individual call [phone] and ask the  to page. If unavailable, ask to be transferred to [facility] Provider on call. [facility] Provider will be available on call 24/7 and during holidays. Primary Care Provider: Unknown, Provider, DPM    No Known Allergies    Reason for Admission: The pt was admitted to the hospital for experiencing a manic episode with psychotic features. Admission Diagnosis: Psychosis ([facility] [F29]  [facility] [F30.9]    * No surgery found *    Results for orders placed or performed during the hospital encounter of 01/04/22   COVID-19 WITH INFLUENZA A/B   Result Value Ref Range    SARS-CoV-2 Not Detected Not Detected      Influenza A by PCR Not Detected Not Detected      Influenza B by PCR Not Detected Not Detected     CBC WITH AUTOMATED DIFF   Result Value Ref Range    WBC 6.3 4.1 - 11.1 K/uL    RBC 4.41 4.10 - 5.70 M/uL    HGB 14.0 12.1 - 17.0 g/dL    HCT 41.1 36.6 - 50.3 %    MCV 93.2 80.0 - 99.0 FL    MCH 31.7 26.0 - 34.0 PG    MCHC 34.1 30.0 - 36.5 g/dL    RDW 11.5 11.5 - 14.5 %    PLATELET 614 769 - 601 K/uL    MPV 9.9 8.9 - 12.9 FL    NRBC 0.0 0.0  WBC    ABSOLUTE NRBC 0.00 0.00 - 0.01 K/uL    NEUTROPHILS 54 32 - 75 %    LYMPHOCYTES 30 12 - 49 %    MONOCYTES 12 5 - 13 %    EOSINOPHILS 3 0 - 7 %    BASOPHILS 1 0 - 1 %    IMMATURE GRANULOCYTES 0 0 - 0.5 %    ABS. NEUTROPHILS 3.5 1.8 - 8.0 K/UL    ABS. LYMPHOCYTES 1.9 0.8 - 3.5 K/UL    ABS. MONOCYTES 0.7 0.0 - 1.0 K/UL    ABS. EOSINOPHILS 0.2 0.0 - 0.4 K/UL    ABS. BASOPHILS 0.0 0.0 - 0.1 K/UL    ABS. IMM.  GRANS. 0.0 0.00 - 0.04 K/UL    DF AUTOMATED     METABOLIC PANEL, BASIC   Result Value Ref Range    Sodium 135 (L) 136 - 145 mmol/L    Potassium 3.7 3.5 - 5.1 mmol/L    Chloride 105 97 - 108 mmol/L    CO2 27 21 - 32 mmol/L    Anion gap 3 (L) 5 - 15 mmol/L    Glucose 99 65 - 100 mg/dL    BUN 17 6 - 20 mg/dL    Creatinine 0.98 0.70 - 1.30 mg/dL    BUN/Creatinine ratio 17 12 - 20      GFR est AA >60 >60 ml/min/1.73m2    GFR est non-AA >60 >60 ml/min/1.73m2    Calcium 9.0 8.5 - 10.1 mg/dL   ACETAMINOPHEN   Result Value Ref Range    Acetaminophen level <10 (L) 10 - 30 ug/mL   SALICYLATE   Result Value Ref Range    Salicylate level <8.6 (L) 2.8 - 20.0 mg/dL   URINALYSIS W/ REFLEX CULTURE    Specimen: Urine   Result Value Ref Range    Color Yellow/Straw      Appearance Turbid (A) Clear      Specific gravity 1.021 1.003 - 1.030      pH (UA) 7.0 5.0 - 8.0      Protein Negative Negative mg/dL    Glucose Negative Negative mg/dL    Ketone 20 (A) Negative mg/dL    Bilirubin Negative Negative      Blood Negative Negative      Urobilinogen 0.1 0.1 - 1.0 EU/dL    Nitrites Negative Negative      Leukocyte Esterase Negative Negative      UA:UC IF INDICATED Culture not indicated by UA result Culture not indicated by UA result      WBC 0-4 0 - 4 /hpf    RBC 0-5 0 - 5 /hpf    Bacteria Negative Negative /hpf    Mucus Trace /lpf    PRESUMPTIVE YEAST Occasional     DRUG SCREEN, URINE   Result Value Ref Range    AMPHETAMINES Negative Negative      BARBITURATES Negative Negative      BENZODIAZEPINES Negative Negative      COCAINE Negative Negative      METHADONE Negative Negative      OPIATES Negative Negative      PCP(PHENCYCLIDINE) Negative Negative      THC (TH-CANNABINOL) Negative Negative      Drug screen comment        This test is a screen for drugs of abuse in a medical setting only (i.e., they are unconfirmed results and as such must not be used for non-medical purposes, e.g.,employment testing, legal testing). Due to its inherent nature, false positive (FP) and false negative (FN) results may be obtained.  Therefore, if necessary for medical care, recommend confirmation of positive findings by GC/MS. ETHYL ALCOHOL   Result Value Ref Range    ALCOHOL(ETHYL),SERUM <4 <10 mg/dL   LITHIUM   Result Value Ref Range    Lithium level 1.10 0.60 - 1.20 mmol/L   VITAMIN B12   Result Value Ref Range    Vitamin B12 1,515 (H) 193 - 986 pg/mL   LITHIUM   Result Value Ref Range    Lithium level 0.54 (L) 0.60 - 1.20 mmol/L   LITHIUM   Result Value Ref Range    Lithium level 0.60 0.60 - 1.20 mmol/L   LITHIUM   Result Value Ref Range    Lithium level 0.50 (L) 0.60 - 1.20 mmol/L       Immunizations administered during this encounter: There is no immunization history on file for this patient. Screening for Metabolic Disorders for Patients on Antipsychotic Medications  (Data obtained from the EMR)    Estimated Body Mass Index  Estimated body mass index is 39.16 kg/m² as calculated from the following:    Height as of this encounter: 5' 7\" (1.702 m). Weight as of this encounter: 113.4 kg (250 lb). Vital Signs/Blood Pressure  Visit Vitals  /75   Pulse 91   Temp 97.4 °F (36.3 °C)   Resp 18   Ht 5' 7\" (1.702 m)   Wt 113.4 kg (250 lb)   SpO2 96%   BMI 39.16 kg/m²       Blood Glucose/Hemoglobin A1c  Lab Results   Component Value Date/Time    Glucose 99 01/05/2022 12:46 AM       No results found for: HBA1C, [id number]     Lipid Panel  No results found for: CHOL, CHOLX, CHLST, CHOLV, 451944, HDL, HDLP, LDL, LDLC, DLDLP, TGLX, TRIGL, TRIGP, CHHD, CHHDX     Discharge Diagnosis: Psychosis ([facility] [F29]  [facility] [F30.9]      Discharge Plan: The pt is being discharged to , [place]. She will be accompanying him back to [place] via airplane. He will continue to see his existing psychiatrist Tim Munroe in [place]. Discharge Medication List and Instructions:   Current Discharge Medication List      START taking these medications    Details   lithium carbonate 300 mg tablet Take 1 Tablet by mouth three (3) times daily.   Qty: 90 Tablet, Refills: 0  Start date: 1/24/2022      OLANZapine (ZyPREXA) 5 mg tablet Take 1 Tablet by mouth every evening. Qty: 30 Tablet, Refills: 0  Start date: 1/24/2022      QUEtiapine (SEROquel) 200 mg tablet Take 1 Tablet by mouth nightly. Qty: 30 Tablet, Refills: 0  Start date: 1/24/2022         STOP taking these medications       lithium carbonate SR (LITHOBID) 300 mg CR tablet Comments:   Reason for Stopping:               Unresulted Labs (24h ago, onward)            None        To obtain results of studies pending at discharge, please contact [phone]    Follow-up Information     Follow up With Specialties Details Why Contact Roge Sullivan   On 1/27/2022 You have an appointment via telehealth at 10:45am. [phone]  [address] [facility]    Unknown, Provider, DPM Podiatry   Patient not available to ask            Advanced Directive:   Does the patient have an appointed surrogate decision maker? No  Does the patient have a Medical Advance Directive? No  Does the patient have a Psychiatric Advance Directive? No  If the patient does not have a surrogate or Medical Advance Directive AND Psychiatric Advance Directive, the patient was offered information on these advance directives Patient will complete at a later time    Patient Instructions: Please continue all medications until otherwise directed by physician. Tobacco Cessation Discharge Plan:   Is the patient a smoker and needs referral for smoking cessation? No  Patient referred to the following for smoking cessation with an appointment? No     Patient was offered medication to assist with smoking cessation at discharge? No  Was education for smoking cessation added to the discharge instructions? No    Alcohol/Substance Abuse Discharge Plan:   Does the patient have a history of substance/alcohol abuse and requires a referral for treatment?  Yes  Patient referred to the following for substance/alcohol abuse treatment with an appointment? No  Patient was offered medication to assist with alcohol cessation at discharge? Not applicable  Was education for substance/alcohol abuse added to discharge instructions?  Yes    Patient discharged to Home; discussed with patient/caregiver

## 2022-01-24 NOTE — BH NOTES
DISCHARGE SUMMARY    NAME:Avni May  : 1960  MRN: [id number]    The patient Aidan Burt exhibits the ability to control behavior in a less restrictive environment. Patient's level of functioning is improving. No assaultive/destructive behavior has been observed for the past 24 hours. No suicidal/homicidal threat or behavior has been observed for the past 24 hours. There is no evidence of serious medication side effects. Patient has not been in physical or protective restraints for at least the past 24 hours. If weapons involved, how are they secured? The     Is patient aware of and in agreement with discharge plan? ***    Arrangements for medication:  Prescriptions given to patient, given a weeks supply or 30 day supply. Copy of discharge instructions to provider?:  ***    Arrangements for transportation home:  ***    Keep all follow up appointments as scheduled, continue to take prescribed medications per physician instructions.   Mental health crisis number:  904 or your local mental health crisis line number at [facility] WARM LINE      3-428-166-MHAV (0164)      M-F: 9am to 9pm      Sat & Sun: 5pm  9pm  National suicide prevention lines:                             1-575-NKWMKLV (2-314-296-828-846-4902)       0-843-028-TALK ([phone])    Crisis Text Line:  Text HOME to 074742

## 2022-01-24 NOTE — DISCHARGE SUMMARY
PSYCHIATRIC DISCHARGE SUMMARY         IDENTIFICATION:    Patient Name  Rafal Minaya   Date of Birth 1960   [facility] [id number]   Medical Record Number  [id number]      Age  64 y.o. PCP Unknown, Provider, NERY   Admit date:  1/4/2022    Discharge date: 1/24/2022   Room Number  233/02  @ [facility]   Date of Service  1/24/2022            TYPE OF DISCHARGE: REGULAR               CONDITION AT DISCHARGE: stable       PROVISIONAL & DISCHARGE DIAGNOSES:    Bipolar disorder with psychotic features       CC & HISTORY OF PRESENT ILLNESS:  Patient admitted under TDO    Reason for Referral: Bizarre behavior, manic episode, running naked threatening to hurt people.     History of Presenting Problem: Mr. Niurka Murray 64year-old admitted to the behavioral health floor after patient presented with acute exacerbation of his underlying bipolar disorder. Patient was brought via EMS from [facility] for reported bizarre behavior, running around naked, threatening to hurt people, with loose associations disorganized thought process, elated mood.     Patient seen this morning who presents with flight of ideas, elated mood has been having difficulties to sleep with disorganized behavior at [facility]. Patient today still presents with loose associations, less manic has been presenting still with impaired insight and judgment. He states he is ready to go home and that others were accusing him of being naked. He continues to state he was just dancing in the rain and trying to show kids how to avoid chemicals. His thought process continues to be disorganized. Patient has reported that he is working on finding cure for polio. Patient today stated that combination of 5 doses of vaccine for COVID is the best plan and he states he has received 2 Mederna vaccines, Pfizer and J&J vaccines.   Patient reports he was able to sleep here in the hospital.  He states he was not able to his sleep when he was at [facility]. He denies having any hallucinations. He denies having any act of suicidal thoughts intent or plan no homicidal     SOCIAL HISTORY:    Social History     Socioeconomic History    Marital status: SINGLE     Spouse name: Not on file    Number of children: Not on file    Years of education: Not on file    Highest education level: Not on file   Occupational History    Not on file   Tobacco Use    Smoking status: Not on file    Smokeless tobacco: Not on file   Substance and Sexual Activity    Alcohol use: Yes     Alcohol/week: 2.0 standard drinks     Types: 2 Shots of liquor per week     Comment: 2 shots every two weeks    Drug use: Not on file    Sexual activity: Not on file   Other Topics Concern    Not on file   Social History Narrative    Not on file     Social Determinants of Health     Financial Resource Strain:     Difficulty of Paying Living Expenses: Not on file   Food Insecurity:     Worried About Running Out of Food in the Last Year: Not on file    Syeda of Food in the Last Year: Not on file   Transportation Needs:     Lack of Transportation (Medical): Not on file    Lack of Transportation (Non-Medical):  Not on file   Physical Activity:     Days of Exercise per Week: Not on file    Minutes of Exercise per Session: Not on file   Stress:     Feeling of Stress : Not on file   Social Connections:     Frequency of Communication with Friends and Family: Not on file    Frequency of Social Gatherings with Friends and Family: Not on file    Attends Zoroastrianism Services: Not on file    Active Member of Clubs or Organizations: Not on file    Attends Club or Organization Meetings: Not on file    Marital Status: Not on file   Intimate Partner Violence:     Fear of Current or Ex-Partner: Not on file    Emotionally Abused: Not on file    Physically Abused: Not on file    Sexually Abused: Not on file   Housing Stability:     Unable to Pay for Housing in the Last Year: Not on file    Number of Places Lived in the Last Year: Not on file    Unstable Housing in the Last Year: Not on file      FAMILY HISTORY:   History reviewed. No pertinent family history. HOSPITALIZATION COURSE:    Munir Smith was admitted to the inpatient psychiatric unit [facility] for acute psychiatric stabilization in regards to symptomatology as described in the HPI above. While on the unit Munir Smith was involved in individual, group, occupational and milieu therapy. Psychiatric medications were adjusted during this hospitalization. Munir Smith demonstrated a slow, but progressive improvement in overall condition. Much of patient's depression appeared to be related to situational stressors,non compliance with meds and psychological factors. Please see individual progress notes for more specific details regarding patient's hospitalization course. At time of discharge, Munir Smith is without significant problems of depression, psychosis, vero. Patient free of suicidal and homicidal ideations and reports many positive predictive factors in terms of not attempting suicide or homicide. Patient has maximized benefit to be derived from acute inpatient psychiatric treatment. All members of the treatment team concur with each other in regards to plans for discharge today per patient's request.  Patient and family are aware and in agreement with discharge and discharge plan.          LABS AND IMAGAING:    Labs Reviewed   METABOLIC PANEL, BASIC - Abnormal; Notable for the following components:       Result Value    Sodium 135 (*)     Anion gap 3 (*)     All other components within normal limits   ACETAMINOPHEN - Abnormal; Notable for the following components:    Acetaminophen level <10 (*)     All other components within normal limits   SALICYLATE - Abnormal; Notable for the following components:    Salicylate level <6.6 (*)     All other components within normal limits   URINALYSIS W/ REFLEX CULTURE - Abnormal; Notable for the following components:    Appearance Turbid (*)     Ketone 20 (*)     All other components within normal limits   VITAMIN B12 - Abnormal; Notable for the following components:    Vitamin B12 1,515 (*)     All other components within normal limits   LITHIUM - Abnormal; Notable for the following components:    Lithium level 0.54 (*)     All other components within normal limits   LITHIUM - Abnormal; Notable for the following components:    Lithium level 0.50 (*)     All other components within normal limits   COVID-19 WITH INFLUENZA A/B   CBC WITH AUTOMATED DIFF   DRUG SCREEN, URINE   ETHYL ALCOHOL   LITHIUM   LITHIUM     No results found for: DS35, PHEN, PHENO, PHENT, DILF, DS39, PHENY, PTN, VALF2, VALAC, VALP, VALPR, DS6, CRBAM, CRBAMP, CARB2, XCRBAM  Admission on 01/04/2022   Component Date Value Ref Range Status    WBC 01/05/2022 6.3  4.1 - 11.1 K/uL Final    RBC 01/05/2022 4.41  4.10 - 5.70 M/uL Final    HGB 01/05/2022 14.0  12.1 - 17.0 g/dL Final    HCT 01/05/2022 41.1  36.6 - 50.3 % Final    MCV 01/05/2022 93.2  80.0 - 99.0 FL Final    MCH 01/05/2022 31.7  26.0 - 34.0 PG Final    MCHC 01/05/2022 34.1  30.0 - 36.5 g/dL Final    RDW 01/05/2022 11.5  11.5 - 14.5 % Final    PLATELET 17/55/2146 528  150 - 400 K/uL Final    MPV 01/05/2022 9.9  8.9 - 12.9 FL Final    NRBC 01/05/2022 0.0  0.0  WBC Final    ABSOLUTE NRBC 01/05/2022 0.00  0.00 - 0.01 K/uL Final    NEUTROPHILS 01/05/2022 54  32 - 75 % Final    LYMPHOCYTES 01/05/2022 30  12 - 49 % Final    MONOCYTES 01/05/2022 12  5 - 13 % Final    EOSINOPHILS 01/05/2022 3  0 - 7 % Final    BASOPHILS 01/05/2022 1  0 - 1 % Final    IMMATURE GRANULOCYTES 01/05/2022 0  0 - 0.5 % Final    ABS. NEUTROPHILS 01/05/2022 3.5  1.8 - 8.0 K/UL Final    ABS. LYMPHOCYTES 01/05/2022 1.9  0.8 - 3.5 K/UL Final    ABS. MONOCYTES 01/05/2022 0.7  0.0 - 1.0 K/UL Final    ABS.  EOSINOPHILS 01/05/2022 0.2  0.0 - 0.4 K/UL Final    ABS. BASOPHILS 01/05/2022 0.0  0.0 - 0.1 K/UL Final    ABS. IMM.  GRANS. 01/05/2022 0.0  0.00 - 0.04 K/UL Final    DF 01/05/2022 AUTOMATED    Final    Sodium 01/05/2022 135* 136 - 145 mmol/L Final    Potassium 01/05/2022 3.7  3.5 - 5.1 mmol/L Final    Chloride 01/05/2022 105  97 - 108 mmol/L Final    CO2 01/05/2022 27  21 - 32 mmol/L Final    Anion gap 01/05/2022 3* 5 - 15 mmol/L Final    Glucose 01/05/2022 99  65 - 100 mg/dL Final    BUN 01/05/2022 17  6 - 20 mg/dL Final    Creatinine 01/05/2022 0.98  0.70 - 1.30 mg/dL Final    BUN/Creatinine ratio 01/05/2022 17  12 - 20   Final    GFR est AA 01/05/2022 >60  >60 ml/min/1.73m2 Final    GFR est non-AA 01/05/2022 >60  >60 ml/min/1.73m2 Final    Calcium 01/05/2022 9.0  8.5 - 10.1 mg/dL Final    Acetaminophen level 01/05/2022 <10* 10 - 30 ug/mL Final    Salicylate level 69/90/9180 <1.7* 2.8 - 20.0 mg/dL Final    Color 01/05/2022 Yellow/Straw    Final    Appearance 01/05/2022 Turbid* Clear   Final    Specific gravity 01/05/2022 1.021  1.003 - 1.030   Final    pH (UA) 01/05/2022 7.0  5.0 - 8.0   Final    Protein 01/05/2022 Negative  Negative mg/dL Final    Glucose 01/05/2022 Negative  Negative mg/dL Final    Ketone 01/05/2022 20* Negative mg/dL Final    Bilirubin 01/05/2022 Negative  Negative   Final    Blood 01/05/2022 Negative  Negative   Final    Urobilinogen 01/05/2022 0.1  0.1 - 1.0 EU/dL Final    Nitrites 01/05/2022 Negative  Negative   Final    Leukocyte Esterase 01/05/2022 Negative  Negative   Final    UA:UC IF INDICATED 01/05/2022 Culture not indicated by UA result  Culture not indicated by UA result   Final    WBC 01/05/2022 0-4  0 - 4 /hpf Final    RBC 01/05/2022 0-5  0 - 5 /hpf Final    Bacteria 01/05/2022 Negative  Negative /hpf Final    Mucus 01/05/2022 Trace  /lpf Final    PRESUMPTIVE YEAST 01/05/2022 Occasional    Final    AMPHETAMINES 01/05/2022 Negative  Negative   Final    BARBITURATES 01/05/2022 Negative Negative   Final    BENZODIAZEPINES 01/05/2022 Negative  Negative   Final    COCAINE 01/05/2022 Negative  Negative   Final    METHADONE 01/05/2022 Negative  Negative   Final    OPIATES 01/05/2022 Negative  Negative   Final    PCP(PHENCYCLIDINE) 01/05/2022 Negative  Negative   Final    THC (TH-CANNABINOL) 01/05/2022 Negative  Negative   Final    Drug screen comment 01/05/2022 This test is a screen for drugs of abuse in a medical setting only (i.e., they are unconfirmed results and as such must not be used for non-medical purposes, e.g.,employment testing, legal testing). Due to its inherent nature, false positive (FP) and false negative (FN) results may be obtained. Therefore, if necessary for medical care, recommend confirmation of positive findings by GC/MS. Final    ALCOHOL(ETHYL),SERUM 01/05/2022 <4  <10 mg/dL Final    SARS-CoV-2 01/05/2022 Not Detected  Not Detected   Final    Influenza A by PCR 01/05/2022 Not Detected  Not Detected   Final    Influenza B by PCR 01/05/2022 Not Detected  Not Detected   Final    Lithium level 01/05/2022 1.10  0.60 - 1.20 mmol/L Final    Vitamin B12 01/08/2022 1,515* 193 - 986 pg/mL Final    Lithium level 01/13/2022 0.54* 0.60 - 1.20 mmol/L Final    Lithium level 01/17/2022 0.60  0.60 - 1.20 mmol/L Final    Lithium level 01/21/2022 0.50* 0.60 - 1.20 mmol/L Final     No results found. DISPOSITION:    Home. Patient to f/u with psychiatric and psychotherapy appointments. FOLLOW-UP CARE:    Activity as tolerated  Regular Diet  Wound Care: none needed.   Follow-up Information     Follow up With Specialties Details Why Contact Roge Phillips   On 1/27/2022 You have an appointment via telehealth at 10:45am. [address] [place] [address]    Unknown, Provider, DPM Podiatry   Patient not available to ask                   PROGNOSIS:   limited---- based on nature of patient's pathology/ies and treatment compliance issues. Prognosis is greatly dependent upon patient's ability to follow up psychiatric/psychotherapy appointments as well as to comply with psychiatric medications as prescribed. DISCHARGE MEDICATIONS:    Informed consent given for the use of following psychotropic medications:  Current Discharge Medication List      START taking these medications    Details   lithium carbonate 300 mg tablet Take 1 Tablet by mouth three (3) times daily. Qty: 90 Tablet, Refills: 0  Start date: 1/24/2022      OLANZapine (ZyPREXA) 5 mg tablet Take 1 Tablet by mouth every evening. Qty: 30 Tablet, Refills: 0  Start date: 1/24/2022      QUEtiapine (SEROquel) 200 mg tablet Take 1 Tablet by mouth nightly. Qty: 30 Tablet, Refills: 0  Start date: 1/24/2022         STOP taking these medications       lithium carbonate SR (LITHOBID) 300 mg CR tablet Comments:   Reason for Stopping:                      Signed:  Rickey Mirza MD  1/24/2022   .

## 2022-01-24 NOTE — BH NOTES
NURSING DISCHARGE NOTE    Discharged to psychologist/[employer]. Affect full. Denied feeling depressed, anxious, and/or suicidal. Rx called in to [facility], by Dr. Sancho Barba; pt given address. Provided with written f/u info, and valuables. Psychologist came to the front door, of the unit and picked pt up.

## 2022-01-24 NOTE — BH NOTES
DISCHARGE SUMMARY    NAME:Avni Peres  : 1960  MRN: [id number]    The patient Zhen Beasley exhibits the ability to control behavior in a less restrictive environment. Patient's level of functioning is improving. No assaultive/destructive behavior has been observed for the past 24 hours. No suicidal/homicidal threat or behavior has been observed for the past 24 hours. There is no evidence of serious medication side effects. Patient has not been in physical or protective restraints for at least the past 24 hours. If weapons involved, how are they secured? The pt denied having access to any firearms. Is patient aware of and in agreement with discharge plan? Yes    Arrangements for medication:  Prescriptions given to patient, given a weeks supply or 30 day supply. Copy of discharge instructions to provider?:  Yes    Arrangements for transportation home:  The pt is being picked up by psychologist Justine Sierra [facility] the [facility]. She will be driving him to the [facility] and flying to [place] with him. Keep all follow up appointments as scheduled, continue to take prescribed medications per physician instructions. Mental health crisis number:  880 or your local mental health crisis line number at [phone].        Mental Health Emergency WARM LINE      [phone]      M-F: 9am to 9pm      Sat & Sun: 5pm  9pm  National suicide prevention lines:                             5-563-PQKTXPI ([phone])       5-944-844-TALK ([phone])    Crisis Text Line:  Text HOME to 380812

## 2022-01-24 NOTE — PROGRESS NOTES
Problem: Falls - Risk of  Goal: *Absence of Falls  Description: Document Judd Chowdary Fall Risk and appropriate interventions in the flowsheet. Outcome: Progressing Towards Goal  Note: Fall Risk Interventions:       Medication Interventions: Teach patient to arise slowly     Problem: Depressed Mood (Adult/Pediatric)  Goal: *STG: Remains safe in hospital  Outcome: Progressing Towards Goal     Problem: Depressed Mood (Adult/Pediatric)  Goal: *STG: Complies with medication therapy  Outcome: Progressing Towards Goal     Problem: Anxiety  Goal: *Alleviation of anxiety  Outcome: Progressing Towards Goal     Problem: Psychosis  Goal: *STG: Decreased hallucinations  Outcome: Progressing Towards Goal     Problem: Psychosis  Goal: *STG: Decreased delusional thinking  Outcome: Progressing Towards Goal     Problem: *Psychosis: Discharge Outcome  Goal: *Absent or Controlled Active Psychotic Symptoms  Outcome: Progressing Towards Goal    -pt ambulates independently; no falls reported or noted.  -pt remains safe; no self injurious behavior noted or reported.  -pt is med compliant.  -pt denies having any anxiety; pt noted to be calm/relaxed.  -pt denies having any hallucinations; none reported and no gestures noted. -no delusional statements made; pt interacts appropriately.  -no psychotic symptoms noted or reported.

## 2022-03-19 PROBLEM — F30.9 MANIA (HCC): Status: ACTIVE | Noted: 2022-01-05

## 2022-03-19 PROBLEM — F29 PSYCHOSIS (HCC): Status: ACTIVE | Noted: 2022-01-05

## 2023-05-15 RX ORDER — OLANZAPINE 5 MG/1
5 TABLET ORAL EVERY EVENING
COMMUNITY
Start: 2022-01-24

## 2023-05-15 RX ORDER — QUETIAPINE FUMARATE 200 MG/1
1 TABLET, FILM COATED ORAL NIGHTLY
COMMUNITY
Start: 2022-01-24

## 2023-05-15 RX ORDER — LITHIUM CARBONATE 300 MG
300 TABLET ORAL 3 TIMES DAILY
COMMUNITY
Start: 2022-01-24
